# Patient Record
Sex: MALE | Race: BLACK OR AFRICAN AMERICAN | Employment: FULL TIME | URBAN - METROPOLITAN AREA
[De-identification: names, ages, dates, MRNs, and addresses within clinical notes are randomized per-mention and may not be internally consistent; named-entity substitution may affect disease eponyms.]

---

## 2017-05-04 ENCOUNTER — OFFICE VISIT (OUTPATIENT)
Dept: INTERNAL MEDICINE CLINIC | Age: 42
End: 2017-05-04

## 2017-05-04 VITALS
HEIGHT: 72 IN | RESPIRATION RATE: 19 BRPM | BODY MASS INDEX: 25.79 KG/M2 | TEMPERATURE: 98 F | HEART RATE: 66 BPM | DIASTOLIC BLOOD PRESSURE: 88 MMHG | OXYGEN SATURATION: 99 % | SYSTOLIC BLOOD PRESSURE: 120 MMHG | WEIGHT: 190.4 LBS

## 2017-05-04 DIAGNOSIS — R33.9 RETENTION, URINE: ICD-10-CM

## 2017-05-04 DIAGNOSIS — Z82.49 FAMILY HISTORY OF HEART DISEASE IN MALE FAMILY MEMBER BEFORE AGE 55: ICD-10-CM

## 2017-05-04 DIAGNOSIS — Z83.3 FAMILY HISTORY OF DIABETES MELLITUS IN MOTHER: ICD-10-CM

## 2017-05-04 DIAGNOSIS — Z76.89 ENCOUNTER TO ESTABLISH CARE: Primary | ICD-10-CM

## 2017-05-04 LAB
BILIRUB UR QL STRIP: NEGATIVE
GLUCOSE UR-MCNC: NEGATIVE MG/DL
KETONES P FAST UR STRIP-MCNC: NEGATIVE MG/DL
PH UR STRIP: 6.5 [PH] (ref 4.6–8)
PROT UR QL STRIP: NEGATIVE MG/DL
SP GR UR STRIP: 1.02 (ref 1–1.03)
UA UROBILINOGEN AMB POC: NORMAL (ref 0.2–1)
URINALYSIS CLARITY POC: CLEAR
URINALYSIS COLOR POC: NORMAL
URINE BLOOD POC: NEGATIVE
URINE LEUKOCYTES POC: NEGATIVE
URINE NITRITES POC: NEGATIVE

## 2017-05-04 RX ORDER — TAMSULOSIN HYDROCHLORIDE 0.4 MG/1
0.4 CAPSULE ORAL DAILY
Qty: 30 CAP | Refills: 1 | Status: ON HOLD | OUTPATIENT
Start: 2017-05-04 | End: 2020-01-01 | Stop reason: SDUPTHER

## 2017-05-04 NOTE — MR AVS SNAPSHOT
Visit Information Date & Time Provider Department Dept. Phone Encounter #  
 5/4/2017  9:30 AM [address] [facility] [phone] [id number] Follow-up Instructions Return in about 3 months (around 8/4/2017) for Full Physical - 30 minutes appointment. Upcoming Health Maintenance Date Due DTaP/Tdap/Td series (1 - Tdap) 7/2/1996 INFLUENZA AGE 9 TO ADULT 8/1/2017 Allergies as of 5/4/2017  Review Complete On: 5/4/2017 By: Vale Anderson MD  
 No Known Allergies Current Immunizations  Never Reviewed No immunizations on file. Not reviewed this visit You Were Diagnosed With   
  
 Codes Comments Encounter to establish care    -  Primary ICD-10-CM: Z76.89 
ICD-9-CM: V65.8 Retention, urine     ICD-10-CM: R33.9 ICD-9-CM: 788.20 Family history of diabetes mellitus in mother     ICD-10-CM: Z80.1 ICD-9-CM: V18.0 Family history of heart disease in male family member before age 54     ICD-10-CM: Z80.55 
ICD-9-CM: V17.49 Vitals BP Pulse Temp Resp Height(growth percentile) Weight(growth percentile) 120/88 (BP 1 Location: Right arm, BP Patient Position: Sitting) 66 98 °F (36.7 °C) (Oral) 19 6' 0.48\" (1.841 m) 190 lb 6.4 oz (86.4 kg) SpO2 BMI Smoking Status 99% 25.48 kg/m2 Current Every Day Smoker BMI and BSA Data Body Mass Index Body Surface Area  
 25.48 kg/m 2 2.1 m 2 Preferred Pharmacy Pharmacy Name Phone [facility] [address] [place] [address] [address] [phone] Your Updated Medication List  
  
   
This list is accurate as of: 5/4/17 10:30 AM.  Always use your most recent med list.  
  
  
  
  
 tamsulosin 0.4 mg capsule Commonly known as:  FLOMAX Take 1 Cap by mouth daily. Prescriptions Sent to Pharmacy Refills  
 tamsulosin (FLOMAX) 0.4 mg capsule 1 Sig: Take 1 Cap by mouth daily. Class: Normal  
 Pharmacy: [facility] [address], [address] [place] [address] #: [phone] Route: Oral  
  
We Performed the Following AMB POC URINALYSIS DIP STICK AUTO W/ MICRO [54340 CPT(R)] CBC WITH AUTOMATED DIFF [53497 CPT(R)] HEMOGLOBIN A1C WITH EAG [60085 CPT(R)] LIPID PANEL [39737 CPT(R)] METABOLIC PANEL, COMPREHENSIVE [18889 CPT(R)] PROSTATE SPECIFIC AG (PSA) [id number] CPT(R)] TSH 3RD GENERATION [84571 CPT(R)] Follow-up Instructions Return in about 3 months (around 8/4/2017) for Full Physical - 30 minutes appointment. Introducing [facility] & HEALTH SERVICES! Jayshree Evans introduces World[employer] patient portal. Now you can access parts of your medical record, email your doctor's office, and request medication refills online. 1. In your internet browser, go to [email or web address] 2. Click on the First Time User? Click Here link in the Sign In box. You will see the New Member Sign Up page. 3. Enter your WorldPassKey Access Code exactly as it appears below. You will not need to use this code after youve completed the sign-up process. If you do not sign up before the expiration date, you must request a new code. · WorldPassKey Access Code: [email or web address] Expires: 8/2/2017  9:46 AM 
 
4. Enter the last four digits of your Social Security Number (xxxx) and Date of Birth (mm/dd/yyyy) as indicated and click Submit. You will be taken to the next sign-up page. 5. Create a Taste Kitchent ID. This will be your WorldPassKey login ID and cannot be changed, so think of one that is secure and easy to remember. 6. Create a Taste Kitchent password. You can change your password at any time. 7. Enter your Password Reset Question and Answer. This can be used at a later time if you forget your password. 8. Enter your e-mail address.  You will receive e-mail notification when new information is available in RatherGather. 9. Click Sign Up. You can now view and download portions of your medical record. 10. Click the Download Summary menu link to download a portable copy of your medical information. If you have questions, please visit the Frequently Asked Questions section of the RatherGather website. Remember, RatherGather is NOT to be used for urgent needs. For medical emergencies, dial 911. Now available from your iPhone and Android! Please provide this summary of care documentation to your next provider. If you have any questions after today's visit, please call [phone].

## 2017-05-04 NOTE — PROGRESS NOTES
New Patient Evaluation    Chandler Mendez is a 39 y.o. male. They are here to establish care with the group and me as a primary care provider. he has seen no physician in the past 10 years. He denies past medical history. He has had issues with urination. Noting slow flow at times. Also some episodic urinary urgency. He denies polyuria. No blood in the urine. He does not drink much water. He eats a lot of junk food. Recently trying to eat better. He does not exercise regularly. He walks. Works as a [employer] technician. There are no active problems to display for this patient. No Known Allergies  No past medical history on file. No past surgical history on file. Family History   Problem Relation Age of Onset   [facility] Breast Cancer Mother     Diabetes Mother     Heart Disease Father     Hypertension Father     Stroke Father      Social History   Substance Use Topics    Smoking status: Current Every Day Smoker     Types: Cigars    Smokeless tobacco: Not on file    Alcohol use 3.0 oz/week     5 Standard drinks or equivalent per week        Health Maintenance   Topic Date Due    DTaP/Tdap/Td series (1 - Tdap) 07/02/1996    INFLUENZA AGE 9 TO ADULT  08/01/2017       Review of Systems   Constitutional: Negative. Respiratory: Negative. Cardiovascular: Negative. Gastrointestinal: Negative. Visit Vitals    /88 (BP 1 Location: Right arm, BP Patient Position: Sitting)    Pulse 66    Temp 98 °F (36.7 °C) (Oral)    Resp 19    Ht 6' 0.48\" (1.841 m)    Wt 190 lb 6.4 oz (86.4 kg)    SpO2 99%    BMI 25.48 kg/m2       Physical Exam   Constitutional: He is well-developed, well-nourished, and in no distress. Pulmonary/Chest: Effort normal and breath sounds normal.   Abdominal: Soft. Bowel sounds are normal.           ASSESSMENT/PLAN    Lin Whitt was seen today for establish care and incomplete bladder emptying.     Diagnoses and all orders for this visit:    Encounter to establish care    Retention, urine  -     AMB POC URINALYSIS DIP STICK AUTO W/ MICRO  -     PROSTATE SPECIFIC AG  -     tamsulosin (FLOMAX) 0.4 mg capsule; Take 1 Cap by mouth daily. Family history of diabetes mellitus in mother  -     TSH 3RD GENERATION  -     HEMOGLOBIN A1C WITH EAG    Family history of heart disease in male family member before age 54  -     [address] DIFF  -     METABOLIC PANEL, COMPREHENSIVE  -     LIPID PANEL        Follow-up Disposition:  Return in about 3 months (around 8/4/2017) for Full Physical - 30 minutes appointment.    -Discussed with the patient to continue the current plan of care. We will obtain baseline labwork and determine if any adjustments need to be done. We will also await the records of the previous PCP to ascertain further details of the patient's history. The patient agrees with and understands the plan of care. All questions have been answered.

## 2017-08-07 ENCOUNTER — DOCUMENTATION ONLY (OUTPATIENT)
Dept: INTERNAL MEDICINE CLINIC | Age: 42
End: 2017-08-07

## 2017-08-07 NOTE — PROGRESS NOTES
appt reminder along with lab slip mailed; reminding pt to have labs drawn prior his upcoming CPE on 8/17.

## 2017-09-18 ENCOUNTER — DOCUMENTATION ONLY (OUTPATIENT)
Dept: INTERNAL MEDICINE CLINIC | Age: 42
End: 2017-09-18

## 2017-09-27 LAB
ALBUMIN SERPL-MCNC: 4.5 G/DL (ref 3.5–5.5)
ALBUMIN/GLOB SERPL: 1.9 {RATIO} (ref 1.2–2.2)
ALP SERPL-CCNC: 61 IU/L (ref 39–117)
ALT SERPL-CCNC: 30 IU/L (ref 0–44)
AST SERPL-CCNC: 24 IU/L (ref 0–40)
BASOPHILS # BLD AUTO: 0 X10E3/UL (ref 0–0.2)
BASOPHILS NFR BLD AUTO: 0 %
BILIRUB SERPL-MCNC: 0.3 MG/DL (ref 0–1.2)
BUN SERPL-MCNC: 16 MG/DL (ref 6–24)
BUN/CREAT SERPL: 15 (ref 9–20)
CALCIUM SERPL-MCNC: 9.5 MG/DL (ref 8.7–10.2)
CHLORIDE SERPL-SCNC: 101 MMOL/L (ref 96–106)
CHOLEST SERPL-MCNC: 220 MG/DL (ref 100–199)
CO2 SERPL-SCNC: 24 MMOL/L (ref 18–29)
CREAT SERPL-MCNC: 1.09 MG/DL (ref 0.76–1.27)
EOSINOPHIL # BLD AUTO: 0.1 X10E3/UL (ref 0–0.4)
EOSINOPHIL NFR BLD AUTO: 3 %
ERYTHROCYTE [DISTWIDTH] IN BLOOD BY AUTOMATED COUNT: 14.5 % (ref 12.3–15.4)
EST. AVERAGE GLUCOSE BLD GHB EST-MCNC: 128 MG/DL
GLOBULIN SER CALC-MCNC: 2.4 G/DL (ref 1.5–4.5)
GLUCOSE SERPL-MCNC: 128 MG/DL (ref 65–99)
HBA1C MFR BLD: 6.1 % (ref 4.8–5.6)
HCT VFR BLD AUTO: 39.6 % (ref 37.5–51)
HDLC SERPL-MCNC: 37 MG/DL
HGB BLD-MCNC: 13.1 G/DL (ref 12.6–17.7)
IMM GRANULOCYTES # BLD: 0 X10E3/UL (ref 0–0.1)
IMM GRANULOCYTES NFR BLD: 0 %
LDLC SERPL CALC-MCNC: 118 MG/DL (ref 0–99)
LYMPHOCYTES # BLD AUTO: 1.5 X10E3/UL (ref 0.7–3.1)
LYMPHOCYTES NFR BLD AUTO: 33 %
MCH RBC QN AUTO: 28.4 PG (ref 26.6–33)
MCHC RBC AUTO-ENTMCNC: 33.1 G/DL (ref 31.5–35.7)
MCV RBC AUTO: 86 FL (ref 79–97)
MONOCYTES # BLD AUTO: 0.4 X10E3/UL (ref 0.1–0.9)
MONOCYTES NFR BLD AUTO: 9 %
NEUTROPHILS # BLD AUTO: 2.6 X10E3/UL (ref 1.4–7)
NEUTROPHILS NFR BLD AUTO: 55 %
PLATELET # BLD AUTO: 194 X10E3/UL (ref 150–379)
POTASSIUM SERPL-SCNC: 4 MMOL/L (ref 3.5–5.2)
PROT SERPL-MCNC: 6.9 G/DL (ref 6–8.5)
PSA SERPL-MCNC: 3 NG/ML (ref 0–4)
RBC # BLD AUTO: 4.61 X10E6/UL (ref 4.14–5.8)
SODIUM SERPL-SCNC: 143 MMOL/L (ref 134–144)
TRIGL SERPL-MCNC: 325 MG/DL (ref 0–149)
TSH SERPL DL<=0.005 MIU/L-ACNC: 1.15 UIU/ML (ref 0.45–4.5)
VLDLC SERPL CALC-MCNC: 65 MG/DL (ref 5–40)
WBC # BLD AUTO: 4.6 X10E3/UL (ref 3.4–10.8)

## 2017-09-28 ENCOUNTER — OFFICE VISIT (OUTPATIENT)
Dept: INTERNAL MEDICINE CLINIC | Age: 42
End: 2017-09-28

## 2017-09-28 VITALS
SYSTOLIC BLOOD PRESSURE: 118 MMHG | BODY MASS INDEX: 24.23 KG/M2 | OXYGEN SATURATION: 98 % | HEIGHT: 73 IN | HEART RATE: 86 BPM | RESPIRATION RATE: 19 BRPM | DIASTOLIC BLOOD PRESSURE: 80 MMHG | WEIGHT: 182.8 LBS | TEMPERATURE: 98 F

## 2017-09-28 DIAGNOSIS — Z00.00 WELL ADULT EXAM: Primary | ICD-10-CM

## 2017-09-28 DIAGNOSIS — Z23 NEED FOR TDAP VACCINATION: ICD-10-CM

## 2017-09-28 DIAGNOSIS — R33.9 URINARY RETENTION: ICD-10-CM

## 2017-09-28 NOTE — MR AVS SNAPSHOT
Visit Information Date & Time Provider Department Dept. Phone Encounter #  
 9/28/2017  8:30 AM [facility] [phone] [id number] Follow-up Instructions Return in about 1 year (around 9/28/2018) for Full Physical - 30 minutes appointment. Upcoming Health Maintenance Date Due DTaP/Tdap/Td series (1 - Tdap) 7/2/1996 Allergies as of 9/28/2017  Review Complete On: 9/28/2017 By: Mynor Jackson MD  
 No Known Allergies Current Immunizations  Never Reviewed Name Date Tdap  Incomplete Not reviewed this visit You Were Diagnosed With   
  
 Codes Comments Well adult exam    -  Primary ICD-10-CM: Z00.00 ICD-9-CM: V70.0 Need for Tdap vaccination     ICD-10-CM: I97 ICD-9-CM: V06.1 Urinary retention     ICD-10-CM: R33.9 ICD-9-CM: 788.20 Vitals BP Pulse Temp Resp Height(growth percentile) Weight(growth percentile) 118/80 (BP 1 Location: Right arm, BP Patient Position: Sitting) 86 98 °F (36.7 °C) (Oral) 19 6' 0.68\" (1.846 m) 182 lb 12.8 oz (82.9 kg) SpO2 BMI Smoking Status 98% 24.33 kg/m2 Current Every Day Smoker Vitals History BMI and BSA Data Body Mass Index Body Surface Area  
 24.33 kg/m 2 2.06 m 2 Preferred Pharmacy Pharmacy Name Phone [facility] [address] [place] [address] [phone] Your Updated Medication List  
  
   
This list is accurate as of: 9/28/17  9:16 AM.  Always use your most recent med list.  
  
  
  
  
 tamsulosin 0.4 mg capsule Commonly known as:  FLOMAX Take 1 Cap by mouth daily. We Performed the Following AR IMMUNIZ ADMIN,1 SINGLE/COMB VAC/TOXOID [id number] CPT(R)] TETANUS, DIPHTHERIA TOXOIDS AND ACELLULAR PERTUSSIS VACCINE (TDAP), IN INDIVIDS. >=7, IM [id number] CPT(R)] Follow-up Instructions Return in about 1 year (around 9/28/2018) for Full Physical - 30 minutes appointment. Introducing [facility] & HEALTH SERVICES! [facility] introduces [employer] patient portal. Now you can access parts of your medical record, email your doctor's office, and request medication refills online. 1. In your internet browser, go to [email or web address] 2. Click on the First Time User? Click Here link in the Sign In box. You will see the New Member Sign Up page. 3. Enter your Kleert Access Code exactly as it appears below. You will not need to use this code after youve completed the sign-up process. If you do not sign up before the expiration date, you must request a new code. · Pigmata Media Access Code: [facility] Expires: 12/27/2017  8:33 AM 
 
4. Enter the last four digits of your Social Security Number (xxxx) and Date of Birth (mm/dd/yyyy) as indicated and click Submit. You will be taken to the next sign-up page. 5. Create a Pigmata Media ID. This will be your Pigmata Media login ID and cannot be changed, so think of one that is secure and easy to remember. 6. Create a Pigmata Media password. You can change your password at any time. 7. Enter your Password Reset Question and Answer. This can be used at a later time if you forget your password. 8. Enter your e-mail address. You will receive e-mail notification when new information is available in [address] 9. Click Sign Up. You can now view and download portions of your medical record. 10. Click the Download Summary menu link to download a portable copy of your medical information. If you have questions, please visit the Frequently Asked Questions section of the Pigmata Media website. Remember, P[employer] Media is NOT to be used for urgent needs. For medical emergencies, dial 911. Now available from your iPhone and Android! Please provide this summary of care documentation to your next provider. If you have any questions after today's visit, please call [phone].

## 2017-09-28 NOTE — PROGRESS NOTES
Comprehensive Physical Examination    Rani Fuentes is a 43 y.o. male. he presents for a comprehensive physical examination. Urinary retention is better on Flomax. No other complaints. There are no active problems to display for this patient. Current Outpatient Prescriptions   Medication Sig Dispense Refill    tamsulosin (FLOMAX) 0.4 mg capsule Take 1 Cap by mouth daily. 30 Cap 1     No Known Allergies  No past medical history on file. History reviewed. No pertinent surgical history. Family History   Problem Relation Age of Onset    Breast Cancer Mother     Diabetes Mother     Heart Disease Father     Hypertension Father     Stroke Father      Social History   Substance Use Topics    Smoking status: Current Every Day Smoker     Types: Cigars    Smokeless tobacco: Never Used    Alcohol use 3.0 oz/week     5 Standard drinks or equivalent per week        Health Maintenance   Topic Date Due    Pneumococcal 19-64 Medium Risk (1 of 1 - PPSV23) 07/02/1994    DTaP/Tdap/Td series (1 - Tdap) 07/02/1996    INFLUENZA AGE 9 TO ADULT  08/01/2017         ROS      Visit Vitals    /80 (BP 1 Location: Right arm, BP Patient Position: Sitting)    Pulse 86    Temp 98 °F (36.7 °C) (Oral)    Resp 19    Ht 6' 0.68\" (1.846 m)    Wt 182 lb 12.8 oz (82.9 kg)    SpO2 98%    BMI 24.33 kg/m2       Physical Exam   Constitutional: He is well-developed, well-nourished, and in no distress. Cardiovascular: Normal rate and regular rhythm. Pulmonary/Chest: Effort normal and breath sounds normal. No respiratory distress. Abdominal: Soft. Bowel sounds are normal.   Musculoskeletal: Normal range of motion. Neurological: He is alert. ASSESSMENT/PLAN  Diagnoses and all orders for this visit:    1. Well adult exam    2. Need for Tdap vaccination  -     TETANUS, DIPHTHERIA TOXOIDS AND ACELLULAR PERTUSSIS VACCINE (TDAP), IN INDIVIDS. >=7, IM  -     [place] IMMUNIZ ADMIN,1 SINGLE/COMB VAC/TOXOID    3. Urinary retention      Follow-up Disposition:  Return in about 1 year (around 9/28/2018) for Full Physical - 30 minutes appointment.

## 2018-12-13 ENCOUNTER — OFFICE VISIT (OUTPATIENT)
Dept: INTERNAL MEDICINE CLINIC | Age: 43
End: 2018-12-13

## 2018-12-13 VITALS
RESPIRATION RATE: 18 BRPM | DIASTOLIC BLOOD PRESSURE: 70 MMHG | HEIGHT: 73 IN | SYSTOLIC BLOOD PRESSURE: 120 MMHG | HEART RATE: 109 BPM | BODY MASS INDEX: 24.68 KG/M2 | TEMPERATURE: 99.6 F | OXYGEN SATURATION: 96 % | WEIGHT: 186.2 LBS

## 2018-12-13 RX ORDER — ONDANSETRON 8 MG/1
8 TABLET, ORALLY DISINTEGRATING ORAL
Qty: 30 TAB | Refills: 0 | Status: SHIPPED | OUTPATIENT
Start: 2018-12-13

## 2018-12-13 NOTE — PROGRESS NOTES
----- Message from Bipin Ponce DO sent at 5/24/2017  3:47 PM CDT -----  continue Coumadin 6 mg MWF and 4 mg the rest of the week. Recheck 1 week. If subtherapeutic 1 week, then increase to 6mg MWF and 4.5mg rest of week with recheck 1 week after.   Pt stated eating two day left over BBQ that his wife brought home from work last night experiencing diarrhea and vomiting. Pt stated taking some of his daughter's nausea medicine that was given to her last year.

## 2018-12-13 NOTE — PATIENT INSTRUCTIONS
Food Poisoning: Care Instructions  Your Care Instructions    Food poisoning occurs when you eat foods that contain harmful germs. Food can be contaminated while it is growing, during processing, or when it is prepared. Fresh fruits and vegetables also can be contaminated if they are washed in contaminated water. You may have become ill after eating undercooked meat or eggs or other unsafe foods. Cooking foods thoroughly and storing them properly can help prevent food poisoning. There are many types of food poisoning. Your symptoms depend on the type of food poisoning you have. You will probably begin to feel better in 1 or 2 days. In the meantime, get plenty of rest and make sure that you do not become dehydrated. Follow-up care is a key part of your treatment and safety. Be sure to make and go to all appointments, and call your doctor if you are having problems. It's also a good idea to know your test results and keep a list of the medicines you take. How can you care for yourself at home? · To prevent dehydration, drink plenty of fluids. Choose water and other caffeine-free clear liquids until you feel better. If you have kidney, heart, or liver disease and have to limit fluids, talk with your doctor before you increase the amount of fluids you drink. · Begin eating small amounts of mild, low-fat foods, depending on how you feel. Try foods like rice, dry crackers, bananas, and applesauce. ? Avoid spicy foods, alcohol, and coffee until 48 hours after all symptoms have disappeared. ? Avoid chewing gum that contains sorbitol. ? Avoid dairy products for 3 days after symptoms disappear. · Take your medicines as prescribed. Call your doctor if you think you are having a problem with your medicine. You will get more details on the specific medicines your doctor prescribes. To prevent food poisoning  · Keep hot foods hot and cold foods cold.   · Do not eat meats, dressings, salads, or other foods that have been kept at room temperature for more than 2 hours. · Use a thermometer to check your refrigerator. It should be between 34°F and 40°F.  · Defrost meats in the refrigerator or microwave, not on the kitchen counter. · Keep your hands and your kitchen clean. Wash your hands, cutting boards, and countertops with hot, soapy water. If you use the same cutting board for chopping vegetables and preparing raw meat, be sure to wash the cutting board with hot, soapy water between each use. · Cook meat until it is well done. · Do not eat raw eggs or uncooked dough or sauces made with raw eggs. · Do not take chances. If you think food looks or tastes spoiled, throw it out. When should you call for help? Call 911 anytime you think you may need emergency care. For example, call if:    · You passed out (lost consciousness).    Call your doctor now or seek immediate medical care if:    · You have new or worse belly pain.     · You have a new or higher fever.     · You are dizzy or lightheaded, or you feel like you may faint.     · You have symptoms of dehydration, such as:  ? Dry eyes and a dry mouth. ? Passing only a little urine. ? Feeling thirstier than normal.     · You cannot keep down medicine or fluids.     · You have new or more blood in stools.     · You have new or worse vomiting or diarrhea.    Watch closely for changes in your health, and be sure to contact your doctor if:    · You do not get better as expected. Where can you learn more? Go to http://tabby-ariadne.info/. Enter J762 in the search box to learn more about \"Food Poisoning: Care Instructions. \"  Current as of: November 18, 2017  Content Version: 11.8  © 5843-5897 Glass. Care instructions adapted under license by [employer] (which disclaims liability or warranty for this information).  If you have questions about a medical condition or this instruction, always ask your healthcare professional. [employer] disclaims any warranty or liability for your use of this information.

## 2019-01-30 NOTE — PROGRESS NOTES
Acute Care Note    Maxim Gaona is 37 y.o. male. he presents for evaluation of GI Problem    The patient presents with nausea and some vomiting with diarrhea after having ingested some chop barbecue which was old. He tells me that his wife had an office party and had brought some of the meat home. Apparently, it had been left out during the day of the party and had not been refrigerated. He is feeling somewhat better upon the visit today but still does complain of nausea. Prior to Admission medications    Medication Sig Start Date End Date Taking? Authorizing Provider   ondansetron (ZOFRAN ODT) 8 mg disintegrating tablet Take 1 Tab by mouth every eight (8) hours as needed for Nausea. 12/13/18  Yes Danny Kearney MD   [facility]) 0.4 mg capsule Take 1 Cap by mouth daily. 5/4/17   Danny Kearney MD         Patient Active Problem List   Diagnosis Code    Urinary retention R33.9         Review of Systems   Constitutional: Negative. Negative for fever. Respiratory: Negative. Cardiovascular: Negative. Gastrointestinal: Positive for diarrhea, nausea and vomiting. Visit Vitals  /70 (BP 1 Location: Right arm, BP Patient Position: Sitting)   Pulse (!) 109   Temp 99.6 °F (37.6 °C) (Oral)   Resp 18   Ht 6' 0.86\" (1.851 m)   Wt 186 lb 3.2 oz (84.5 kg)   SpO2 96%   BMI 24.66 kg/m²       Physical Exam   Constitutional: No distress. Cardiovascular: Normal rate and regular rhythm. Pulmonary/Chest: Effort normal and breath sounds normal.   Abdominal: Soft. Bowel sounds are normal.           ASSESSMENT/PLAN  Diagnoses and all orders for this visit:    1. Food poisoning, accidental or unintentional, initial encounter  -     ondansetron (ZOFRAN ODT) 8 mg disintegrating tablet; Take 1 Tab by mouth every eight (8) hours as needed for Nausea.          Advised the patient to call back or return to office if symptoms worsen/change/persist.   Discussed expected course/resolution/complications of diagnosis in detail with patient. Medication risks/benefits/costs/interactions/alternatives discussed with patient. The patient was given an after visit summary which includes diagnoses, current medications, & vitals. They expressed understanding with the diagnosis and plan.

## 2020-01-01 ENCOUNTER — DOCUMENTATION ONLY (OUTPATIENT)
Dept: ONCOLOGY | Age: 45
End: 2020-01-01

## 2020-01-01 ENCOUNTER — OFFICE VISIT (OUTPATIENT)
Dept: ONCOLOGY | Age: 45
End: 2020-01-01

## 2020-01-01 ENCOUNTER — TELEPHONE (OUTPATIENT)
Dept: SURGERY | Age: 45
End: 2020-01-01

## 2020-01-01 ENCOUNTER — HOSPITAL ENCOUNTER (OUTPATIENT)
Dept: LAB | Age: 45
Discharge: HOME | End: 2020-01-27

## 2020-01-01 ENCOUNTER — APPOINTMENT (OUTPATIENT)
Dept: GENERAL RADIOLOGY | Age: 45
DRG: 698 | End: 2020-01-01
Attending: EMERGENCY MEDICINE
Payer: COMMERCIAL

## 2020-01-01 ENCOUNTER — HOSPITAL ENCOUNTER (OUTPATIENT)
Dept: PET IMAGING | Age: 45
Discharge: HOME | End: 2020-02-13
Attending: SURGERY
Payer: COMMERCIAL

## 2020-01-01 ENCOUNTER — HOSPITAL ENCOUNTER (INPATIENT)
Age: 45
LOS: 6 days | Discharge: HOME HEALTH | DRG: 698 | End: 2020-12-28
Attending: EMERGENCY MEDICINE | Admitting: FAMILY MEDICINE
Payer: COMMERCIAL

## 2020-01-01 ENCOUNTER — HOSPITAL ENCOUNTER (OUTPATIENT)
Dept: INTERVENTIONAL RADIOLOGY/VASCULAR | Age: 45
Discharge: HOME | DRG: 698 | End: 2020-12-23
Attending: HOSPITALIST
Payer: COMMERCIAL

## 2020-01-01 ENCOUNTER — APPOINTMENT (OUTPATIENT)
Dept: CT IMAGING | Age: 45
DRG: 698 | End: 2020-01-01
Attending: EMERGENCY MEDICINE
Payer: COMMERCIAL

## 2020-01-01 ENCOUNTER — OFFICE VISIT (OUTPATIENT)
Dept: SURGERY | Age: 45
End: 2020-01-01

## 2020-01-01 ENCOUNTER — HOSPITAL ENCOUNTER (OUTPATIENT)
Dept: ULTRASOUND IMAGING | Age: 45
Discharge: HOME | End: 2020-01-21
Attending: FAMILY MEDICINE
Payer: COMMERCIAL

## 2020-01-01 ENCOUNTER — APPOINTMENT (OUTPATIENT)
Dept: CT IMAGING | Age: 45
DRG: 698 | End: 2020-01-01
Attending: INTERNAL MEDICINE
Payer: COMMERCIAL

## 2020-01-01 VITALS
WEIGHT: 146.83 LBS | SYSTOLIC BLOOD PRESSURE: 117 MMHG | TEMPERATURE: 97.7 F | HEIGHT: 72 IN | BODY MASS INDEX: 19.89 KG/M2 | RESPIRATION RATE: 18 BRPM | HEART RATE: 123 BPM | DIASTOLIC BLOOD PRESSURE: 83 MMHG | OXYGEN SATURATION: 96 %

## 2020-01-01 VITALS — BODY MASS INDEX: 25.47 KG/M2 | WEIGHT: 188 LBS | HEIGHT: 72 IN

## 2020-01-01 VITALS
DIASTOLIC BLOOD PRESSURE: 87 MMHG | SYSTOLIC BLOOD PRESSURE: 132 MMHG | HEART RATE: 71 BPM | TEMPERATURE: 98.5 F | BODY MASS INDEX: 25.6 KG/M2 | OXYGEN SATURATION: 96 % | HEIGHT: 72 IN | WEIGHT: 189 LBS

## 2020-01-01 VITALS
WEIGHT: 190.8 LBS | TEMPERATURE: 98.6 F | OXYGEN SATURATION: 98 % | HEIGHT: 73 IN | SYSTOLIC BLOOD PRESSURE: 148 MMHG | RESPIRATION RATE: 20 BRPM | BODY MASS INDEX: 25.29 KG/M2 | HEART RATE: 64 BPM | DIASTOLIC BLOOD PRESSURE: 79 MMHG

## 2020-01-01 VITALS
RESPIRATION RATE: 23 BRPM | OXYGEN SATURATION: 96 % | SYSTOLIC BLOOD PRESSURE: 117 MMHG | HEART RATE: 103 BPM | DIASTOLIC BLOOD PRESSURE: 89 MMHG

## 2020-01-01 DIAGNOSIS — C76.0 HEAD AND NECK MALIGNANCY (HCC): Primary | ICD-10-CM

## 2020-01-01 DIAGNOSIS — C76.0 SQUAMOUS CELL CARCINOMA OF HEAD AND NECK (HCC): Primary | ICD-10-CM

## 2020-01-01 DIAGNOSIS — R22.1 NECK SWELLING: ICD-10-CM

## 2020-01-01 DIAGNOSIS — C78.7 HEPATIC METASTASES (HCC): ICD-10-CM

## 2020-01-01 DIAGNOSIS — R33.9 RETENTION, URINE: ICD-10-CM

## 2020-01-01 DIAGNOSIS — R18.0 MALIGNANT ASCITES: ICD-10-CM

## 2020-01-01 DIAGNOSIS — A41.9 SEPSIS, DUE TO UNSPECIFIED ORGANISM, UNSPECIFIED WHETHER ACUTE ORGAN DYSFUNCTION PRESENT (HCC): ICD-10-CM

## 2020-01-01 DIAGNOSIS — J90 PLEURAL EFFUSION: ICD-10-CM

## 2020-01-01 DIAGNOSIS — C79.51 BONY METASTASIS (HCC): ICD-10-CM

## 2020-01-01 DIAGNOSIS — C76.0 HEAD AND NECK CANCER (HCC): ICD-10-CM

## 2020-01-01 DIAGNOSIS — C76.0 HEAD AND NECK MALIGNANCY (HCC): ICD-10-CM

## 2020-01-01 DIAGNOSIS — R59.0 LYMPHADENOPATHY OF HEAD AND NECK REGION: Primary | ICD-10-CM

## 2020-01-01 DIAGNOSIS — T83.098A MALFUNCTION OF NEPHROSTOMY TUBE (HCC): ICD-10-CM

## 2020-01-01 DIAGNOSIS — C80.1 CANCER (HCC): ICD-10-CM

## 2020-01-01 LAB
ABO + RH BLD: NORMAL
ALBUMIN SERPL-MCNC: 1.7 G/DL (ref 3.5–5)
ALBUMIN SERPL-MCNC: 1.8 G/DL (ref 3.5–5)
ALBUMIN SERPL-MCNC: 1.8 G/DL (ref 3.5–5)
ALBUMIN SERPL-MCNC: 1.9 G/DL (ref 3.5–5)
ALBUMIN SERPL-MCNC: 2.1 G/DL (ref 3.5–5)
ALBUMIN SERPL-MCNC: 2.4 G/DL (ref 3.5–5)
ALBUMIN/GLOB SERPL: 0.5 {RATIO} (ref 1.1–2.2)
ALBUMIN/GLOB SERPL: 0.6 {RATIO} (ref 1.1–2.2)
ALBUMIN/GLOB SERPL: 0.6 {RATIO} (ref 1.1–2.2)
ALP SERPL-CCNC: 316 U/L (ref 45–117)
ALP SERPL-CCNC: 318 U/L (ref 45–117)
ALP SERPL-CCNC: 320 U/L (ref 45–117)
ALP SERPL-CCNC: 321 U/L (ref 45–117)
ALP SERPL-CCNC: 352 U/L (ref 45–117)
ALP SERPL-CCNC: 405 U/L (ref 45–117)
ALT SERPL-CCNC: 28 U/L (ref 12–78)
ALT SERPL-CCNC: 30 U/L (ref 12–78)
ALT SERPL-CCNC: 34 U/L (ref 12–78)
ALT SERPL-CCNC: 37 U/L (ref 12–78)
ALT SERPL-CCNC: 42 U/L (ref 12–78)
ALT SERPL-CCNC: 52 U/L (ref 12–78)
ANION GAP SERPL CALC-SCNC: 11 MMOL/L (ref 5–15)
ANION GAP SERPL CALC-SCNC: 15 MMOL/L (ref 5–15)
ANION GAP SERPL CALC-SCNC: 8 MMOL/L (ref 5–15)
ANION GAP SERPL CALC-SCNC: 9 MMOL/L (ref 5–15)
APPEARANCE UR: ABNORMAL
APTT PPP: 35.6 SEC (ref 22.1–31)
APTT PPP: 37.6 SEC (ref 22.1–31)
AST SERPL-CCNC: 105 U/L (ref 15–37)
AST SERPL-CCNC: 118 U/L (ref 15–37)
AST SERPL-CCNC: 61 U/L (ref 15–37)
AST SERPL-CCNC: 62 U/L (ref 15–37)
AST SERPL-CCNC: 62 U/L (ref 15–37)
AST SERPL-CCNC: 77 U/L (ref 15–37)
ATRIAL RATE: 107 BPM
BACTERIA SPEC CULT: NORMAL
BACTERIA SPEC CULT: NORMAL
BACTERIA URNS QL MICRO: ABNORMAL /HPF
BASOPHILS # BLD: 0 K/UL (ref 0–0.1)
BASOPHILS # BLD: 0.3 K/UL (ref 0–0.1)
BASOPHILS NFR BLD: 0 % (ref 0–1)
BASOPHILS NFR BLD: 1 % (ref 0–1)
BILIRUB SERPL-MCNC: 0.3 MG/DL (ref 0.2–1)
BILIRUB SERPL-MCNC: 0.4 MG/DL (ref 0.2–1)
BILIRUB SERPL-MCNC: 0.5 MG/DL (ref 0.2–1)
BILIRUB SERPL-MCNC: 0.9 MG/DL (ref 0.2–1)
BILIRUB UR QL CFM: NEGATIVE
BLD PROD TYP BPU: NORMAL
BLOOD GROUP ANTIBODIES SERPL: NORMAL
BPU ID: NORMAL
BUN SERPL-MCNC: 20 MG/DL (ref 6–20)
BUN SERPL-MCNC: 23 MG/DL (ref 6–20)
BUN SERPL-MCNC: 25 MG/DL (ref 6–20)
BUN SERPL-MCNC: 27 MG/DL (ref 6–20)
BUN SERPL-MCNC: 30 MG/DL (ref 6–20)
BUN SERPL-MCNC: 33 MG/DL (ref 6–20)
BUN/CREAT SERPL: 22 (ref 12–20)
BUN/CREAT SERPL: 23 (ref 12–20)
BUN/CREAT SERPL: 24 (ref 12–20)
BUN/CREAT SERPL: 28 (ref 12–20)
BUN/CREAT SERPL: 28 (ref 12–20)
BUN/CREAT SERPL: 31 (ref 12–20)
CALCIUM SERPL-MCNC: 6.5 MG/DL (ref 8.5–10.1)
CALCIUM SERPL-MCNC: 6.8 MG/DL (ref 8.5–10.1)
CALCIUM SERPL-MCNC: 6.8 MG/DL (ref 8.5–10.1)
CALCIUM SERPL-MCNC: 7.1 MG/DL (ref 8.5–10.1)
CALCIUM SERPL-MCNC: 7.4 MG/DL (ref 8.5–10.1)
CALCIUM SERPL-MCNC: 8.9 MG/DL (ref 8.5–10.1)
CALCULATED P AXIS, ECG09: 58 DEGREES
CALCULATED R AXIS, ECG10: 69 DEGREES
CALCULATED T AXIS, ECG11: 74 DEGREES
CHLORIDE SERPL-SCNC: 102 MMOL/L (ref 97–108)
CHLORIDE SERPL-SCNC: 109 MMOL/L (ref 97–108)
CHLORIDE SERPL-SCNC: 110 MMOL/L (ref 97–108)
CHLORIDE SERPL-SCNC: 110 MMOL/L (ref 97–108)
CHLORIDE SERPL-SCNC: 111 MMOL/L (ref 97–108)
CHLORIDE SERPL-SCNC: 111 MMOL/L (ref 97–108)
CO2 SERPL-SCNC: 21 MMOL/L (ref 21–32)
CO2 SERPL-SCNC: 22 MMOL/L (ref 21–32)
CO2 SERPL-SCNC: 23 MMOL/L (ref 21–32)
CO2 SERPL-SCNC: 25 MMOL/L (ref 21–32)
COLOR UR: ABNORMAL
COMMENT, HOLDF: NORMAL
COPPER SERPL-MCNC: 155 UG/DL (ref 72–166)
CREAT SERPL-MCNC: 0.82 MG/DL (ref 0.7–1.3)
CREAT SERPL-MCNC: 0.89 MG/DL (ref 0.7–1.3)
CREAT SERPL-MCNC: 0.96 MG/DL (ref 0.7–1.3)
CREAT SERPL-MCNC: 1.03 MG/DL (ref 0.7–1.3)
CREAT SERPL-MCNC: 1.15 MG/DL (ref 0.7–1.3)
CREAT SERPL-MCNC: 1.2 MG/DL (ref 0.7–1.3)
CROSSMATCH RESULT,%XM: NORMAL
DIAGNOSIS, 93000: NORMAL
DIFFERENTIAL METHOD BLD: ABNORMAL
EOSINOPHIL # BLD: 0 K/UL (ref 0–0.4)
EOSINOPHIL # BLD: 0.3 K/UL (ref 0–0.4)
EOSINOPHIL # BLD: 0.3 K/UL (ref 0–0.4)
EOSINOPHIL NFR BLD: 0 % (ref 0–7)
EOSINOPHIL NFR BLD: 1 % (ref 0–7)
EOSINOPHIL NFR BLD: 1 % (ref 0–7)
EPITH CASTS URNS QL MICRO: ABNORMAL /LPF
ERYTHROCYTE [DISTWIDTH] IN BLOOD BY AUTOMATED COUNT: 18.4 % (ref 11.5–14.5)
ERYTHROCYTE [DISTWIDTH] IN BLOOD BY AUTOMATED COUNT: 18.4 % (ref 11.5–14.5)
ERYTHROCYTE [DISTWIDTH] IN BLOOD BY AUTOMATED COUNT: 18.6 % (ref 11.5–14.5)
ERYTHROCYTE [DISTWIDTH] IN BLOOD BY AUTOMATED COUNT: 18.6 % (ref 11.5–14.5)
ERYTHROCYTE [DISTWIDTH] IN BLOOD BY AUTOMATED COUNT: 18.8 % (ref 11.5–14.5)
ERYTHROCYTE [DISTWIDTH] IN BLOOD BY AUTOMATED COUNT: 18.9 % (ref 11.5–14.5)
ERYTHROCYTE [DISTWIDTH] IN BLOOD BY AUTOMATED COUNT: 19 % (ref 11.5–14.5)
FERRITIN SERPL-MCNC: 8447 NG/ML (ref 26–388)
FIBRINOGEN PPP-MCNC: 175 MG/DL (ref 200–475)
FIBRINOGEN PPP-MCNC: 175 MG/DL (ref 200–475)
FOLATE SERPL-MCNC: 14.1 NG/ML (ref 5–21)
FOLATE SERPL-MCNC: 7.9 NG/ML (ref 5–21)
GLOBULIN SER CALC-MCNC: 3.3 G/DL (ref 2–4)
GLOBULIN SER CALC-MCNC: 3.5 G/DL (ref 2–4)
GLOBULIN SER CALC-MCNC: 3.5 G/DL (ref 2–4)
GLOBULIN SER CALC-MCNC: 3.6 G/DL (ref 2–4)
GLOBULIN SER CALC-MCNC: 3.7 G/DL (ref 2–4)
GLOBULIN SER CALC-MCNC: 4.2 G/DL (ref 2–4)
GLUCOSE BLD STRIP.AUTO-MCNC: 81 MG/DL (ref 65–100)
GLUCOSE SERPL-MCNC: 59 MG/DL (ref 65–100)
GLUCOSE SERPL-MCNC: 67 MG/DL (ref 65–100)
GLUCOSE SERPL-MCNC: 72 MG/DL (ref 65–100)
GLUCOSE SERPL-MCNC: 74 MG/DL (ref 65–100)
GLUCOSE SERPL-MCNC: 84 MG/DL (ref 65–100)
GLUCOSE SERPL-MCNC: 85 MG/DL (ref 65–100)
GLUCOSE UR STRIP.AUTO-MCNC: NEGATIVE MG/DL
HAPTOGLOB SERPL-MCNC: 296 MG/DL (ref 30–200)
HCT VFR BLD AUTO: 20.2 % (ref 36.6–50.3)
HCT VFR BLD AUTO: 22.5 % (ref 36.6–50.3)
HCT VFR BLD AUTO: 22.9 % (ref 36.6–50.3)
HCT VFR BLD AUTO: 23.6 % (ref 36.6–50.3)
HCT VFR BLD AUTO: 24 % (ref 36.6–50.3)
HCT VFR BLD AUTO: 24.8 % (ref 36.6–50.3)
HCT VFR BLD AUTO: 29.8 % (ref 36.6–50.3)
HGB BLD-MCNC: 6.5 G/DL (ref 12.1–17)
HGB BLD-MCNC: 7.1 G/DL (ref 12.1–17)
HGB BLD-MCNC: 7.4 G/DL (ref 12.1–17)
HGB BLD-MCNC: 7.5 G/DL (ref 12.1–17)
HGB BLD-MCNC: 7.7 G/DL (ref 12.1–17)
HGB BLD-MCNC: 8 G/DL (ref 12.1–17)
HGB BLD-MCNC: 9.5 G/DL (ref 12.1–17)
HGB UR QL STRIP: ABNORMAL
HISTORY CHECKED?,CKHIST: NORMAL
IMM GRANULOCYTES # BLD AUTO: 0 K/UL
IMM GRANULOCYTES NFR BLD AUTO: 0 %
INR PPP: 1.4 (ref 0.9–1.1)
INR PPP: 1.4 (ref 0.9–1.1)
INR PPP: 1.5 (ref 0.9–1.1)
IRON SATN MFR SERPL: 51 % (ref 20–50)
IRON SERPL-MCNC: 83 UG/DL (ref 35–150)
KETONES UR QL STRIP.AUTO: ABNORMAL MG/DL
LACTATE SERPL-SCNC: 2.2 MMOL/L (ref 0.4–2)
LACTATE SERPL-SCNC: 2.2 MMOL/L (ref 0.4–2)
LACTATE SERPL-SCNC: 2.6 MMOL/L (ref 0.4–2)
LACTATE SERPL-SCNC: 2.6 MMOL/L (ref 0.4–2)
LACTATE SERPL-SCNC: 2.7 MMOL/L (ref 0.4–2)
LACTATE SERPL-SCNC: 2.8 MMOL/L (ref 0.4–2)
LACTATE SERPL-SCNC: 3.5 MMOL/L (ref 0.4–2)
LACTATE SERPL-SCNC: 3.7 MMOL/L (ref 0.4–2)
LACTATE SERPL-SCNC: 4.3 MMOL/L (ref 0.4–2)
LACTATE SERPL-SCNC: 4.5 MMOL/L (ref 0.4–2)
LDH SERPL L TO P-CCNC: 3478 U/L (ref 85–241)
LEUKOCYTE ESTERASE UR QL STRIP.AUTO: ABNORMAL
LIPASE SERPL-CCNC: 176 U/L (ref 73–393)
LYMPHOCYTES # BLD: 0 K/UL (ref 0.8–3.5)
LYMPHOCYTES # BLD: 0.3 K/UL (ref 0.8–3.5)
LYMPHOCYTES # BLD: 0.6 K/UL (ref 0.8–3.5)
LYMPHOCYTES # BLD: 0.6 K/UL (ref 0.8–3.5)
LYMPHOCYTES # BLD: 0.8 K/UL (ref 0.8–3.5)
LYMPHOCYTES # BLD: 1.3 K/UL (ref 0.8–3.5)
LYMPHOCYTES # BLD: 1.4 K/UL (ref 0.8–3.5)
LYMPHOCYTES NFR BLD: 0 % (ref 12–49)
LYMPHOCYTES NFR BLD: 1 % (ref 12–49)
LYMPHOCYTES NFR BLD: 2 % (ref 12–49)
LYMPHOCYTES NFR BLD: 2 % (ref 12–49)
LYMPHOCYTES NFR BLD: 3 % (ref 12–49)
LYMPHOCYTES NFR BLD: 4 % (ref 12–49)
LYMPHOCYTES NFR BLD: 4 % (ref 12–49)
MAGNESIUM SERPL-MCNC: 1.3 MG/DL (ref 1.6–2.4)
MAGNESIUM SERPL-MCNC: 1.4 MG/DL (ref 1.6–2.4)
MCH RBC QN AUTO: 28.2 PG (ref 26–34)
MCH RBC QN AUTO: 28.3 PG (ref 26–34)
MCH RBC QN AUTO: 28.3 PG (ref 26–34)
MCH RBC QN AUTO: 28.5 PG (ref 26–34)
MCH RBC QN AUTO: 28.5 PG (ref 26–34)
MCH RBC QN AUTO: 28.6 PG (ref 26–34)
MCH RBC QN AUTO: 28.9 PG (ref 26–34)
MCHC RBC AUTO-ENTMCNC: 31.6 G/DL (ref 30–36.5)
MCHC RBC AUTO-ENTMCNC: 31.8 G/DL (ref 30–36.5)
MCHC RBC AUTO-ENTMCNC: 31.9 G/DL (ref 30–36.5)
MCHC RBC AUTO-ENTMCNC: 32.1 G/DL (ref 30–36.5)
MCHC RBC AUTO-ENTMCNC: 32.2 G/DL (ref 30–36.5)
MCHC RBC AUTO-ENTMCNC: 32.3 G/DL (ref 30–36.5)
MCHC RBC AUTO-ENTMCNC: 32.3 G/DL (ref 30–36.5)
MCV RBC AUTO: 88.1 FL (ref 80–99)
MCV RBC AUTO: 88.2 FL (ref 80–99)
MCV RBC AUTO: 88.7 FL (ref 80–99)
MCV RBC AUTO: 89 FL (ref 80–99)
MCV RBC AUTO: 89.3 FL (ref 80–99)
MCV RBC AUTO: 89.5 FL (ref 80–99)
MCV RBC AUTO: 89.7 FL (ref 80–99)
METAMYELOCYTES NFR BLD MANUAL: 1 %
METAMYELOCYTES NFR BLD MANUAL: 2 %
MONOCYTES # BLD: 0.3 K/UL (ref 0–1)
MONOCYTES # BLD: 0.3 K/UL (ref 0–1)
MONOCYTES # BLD: 0.6 K/UL (ref 0–1)
MONOCYTES # BLD: 0.7 K/UL (ref 0–1)
MONOCYTES # BLD: 0.8 K/UL (ref 0–1)
MONOCYTES # BLD: 0.8 K/UL (ref 0–1)
MONOCYTES # BLD: 1.3 K/UL (ref 0–1)
MONOCYTES NFR BLD: 1 % (ref 5–13)
MONOCYTES NFR BLD: 1 % (ref 5–13)
MONOCYTES NFR BLD: 2 % (ref 5–13)
MONOCYTES NFR BLD: 2 % (ref 5–13)
MONOCYTES NFR BLD: 3 % (ref 5–13)
MONOCYTES NFR BLD: 3 % (ref 5–13)
MONOCYTES NFR BLD: 4 % (ref 5–13)
MYELOCYTES NFR BLD MANUAL: 1 %
MYELOCYTES NFR BLD MANUAL: 2 %
MYELOCYTES NFR BLD MANUAL: 3 %
NEUTS BAND NFR BLD MANUAL: 10 % (ref 0–6)
NEUTS BAND NFR BLD MANUAL: 12 % (ref 0–6)
NEUTS BAND NFR BLD MANUAL: 14 % (ref 0–6)
NEUTS BAND NFR BLD MANUAL: 2 % (ref 0–6)
NEUTS BAND NFR BLD MANUAL: 3 % (ref 0–6)
NEUTS BAND NFR BLD MANUAL: 4 % (ref 0–6)
NEUTS BAND NFR BLD MANUAL: 8 % (ref 0–6)
NEUTS SEG # BLD: 23.2 K/UL (ref 1.8–8)
NEUTS SEG # BLD: 25.3 K/UL (ref 1.8–8)
NEUTS SEG # BLD: 29 K/UL (ref 1.8–8)
NEUTS SEG # BLD: 29.1 K/UL (ref 1.8–8)
NEUTS SEG # BLD: 29.2 K/UL (ref 1.8–8)
NEUTS SEG # BLD: 32.2 K/UL (ref 1.8–8)
NEUTS SEG # BLD: 33.7 K/UL (ref 1.8–8)
NEUTS SEG NFR BLD: 75 % (ref 32–75)
NEUTS SEG NFR BLD: 77 % (ref 32–75)
NEUTS SEG NFR BLD: 80 % (ref 32–75)
NEUTS SEG NFR BLD: 88 % (ref 32–75)
NEUTS SEG NFR BLD: 88 % (ref 32–75)
NEUTS SEG NFR BLD: 93 % (ref 32–75)
NEUTS SEG NFR BLD: 95 % (ref 32–75)
NITRITE UR QL STRIP.AUTO: POSITIVE
NRBC # BLD: 0.18 K/UL (ref 0–0.01)
NRBC # BLD: 0.19 K/UL (ref 0–0.01)
NRBC # BLD: 0.23 K/UL (ref 0–0.01)
NRBC # BLD: 0.23 K/UL (ref 0–0.01)
NRBC # BLD: 0.24 K/UL (ref 0–0.01)
NRBC # BLD: 0.25 K/UL (ref 0–0.01)
NRBC # BLD: 0.35 K/UL (ref 0–0.01)
NRBC BLD-RTO: 0.5 PER 100 WBC
NRBC BLD-RTO: 0.6 PER 100 WBC
NRBC BLD-RTO: 0.7 PER 100 WBC
NRBC BLD-RTO: 0.8 PER 100 WBC
NRBC BLD-RTO: 0.9 PER 100 WBC
NRBC BLD-RTO: 0.9 PER 100 WBC
NRBC BLD-RTO: 1 PER 100 WBC
P-R INTERVAL, ECG05: 92 MS
PH UR STRIP: 6.5 [PH] (ref 5–8)
PHOSPHATE SERPL-MCNC: 4.8 MG/DL (ref 2.6–4.7)
PLATELET # BLD AUTO: 108 K/UL (ref 150–400)
PLATELET # BLD AUTO: 128 K/UL (ref 150–400)
PLATELET # BLD AUTO: 71 K/UL (ref 150–400)
PLATELET # BLD AUTO: 73 K/UL (ref 150–400)
PLATELET # BLD AUTO: 77 K/UL (ref 150–400)
PLATELET # BLD AUTO: 83 K/UL (ref 150–400)
PLATELET # BLD AUTO: 98 K/UL (ref 150–400)
PMV BLD AUTO: 10.1 FL (ref 8.9–12.9)
PMV BLD AUTO: 10.1 FL (ref 8.9–12.9)
PMV BLD AUTO: 10.2 FL (ref 8.9–12.9)
PMV BLD AUTO: 10.3 FL (ref 8.9–12.9)
PMV BLD AUTO: 10.5 FL (ref 8.9–12.9)
PMV BLD AUTO: 9.9 FL (ref 8.9–12.9)
PMV BLD AUTO: 9.9 FL (ref 8.9–12.9)
POTASSIUM SERPL-SCNC: 3.2 MMOL/L (ref 3.5–5.1)
POTASSIUM SERPL-SCNC: 3.3 MMOL/L (ref 3.5–5.1)
POTASSIUM SERPL-SCNC: 3.6 MMOL/L (ref 3.5–5.1)
POTASSIUM SERPL-SCNC: 3.7 MMOL/L (ref 3.5–5.1)
POTASSIUM SERPL-SCNC: 3.8 MMOL/L (ref 3.5–5.1)
POTASSIUM SERPL-SCNC: 3.9 MMOL/L (ref 3.5–5.1)
PROT SERPL-MCNC: 5.1 G/DL (ref 6.4–8.2)
PROT SERPL-MCNC: 5.2 G/DL (ref 6.4–8.2)
PROT SERPL-MCNC: 5.3 G/DL (ref 6.4–8.2)
PROT SERPL-MCNC: 5.5 G/DL (ref 6.4–8.2)
PROT SERPL-MCNC: 5.8 G/DL (ref 6.4–8.2)
PROT SERPL-MCNC: 6.6 G/DL (ref 6.4–8.2)
PROT UR STRIP-MCNC: 100 MG/DL
PROTHROMBIN TIME: 14.8 SEC (ref 9–11.1)
PROTHROMBIN TIME: 14.9 SEC (ref 9–11.1)
PROTHROMBIN TIME: 15.4 SEC (ref 9–11.1)
Q-T INTERVAL, ECG07: 318 MS
QRS DURATION, ECG06: 68 MS
QTC CALCULATION (BEZET), ECG08: 424 MS
RBC # BLD AUTO: 2.27 M/UL (ref 4.1–5.7)
RBC # BLD AUTO: 2.52 M/UL (ref 4.1–5.7)
RBC # BLD AUTO: 2.6 M/UL (ref 4.1–5.7)
RBC # BLD AUTO: 2.63 M/UL (ref 4.1–5.7)
RBC # BLD AUTO: 2.72 M/UL (ref 4.1–5.7)
RBC # BLD AUTO: 2.77 M/UL (ref 4.1–5.7)
RBC # BLD AUTO: 3.36 M/UL (ref 4.1–5.7)
RBC #/AREA URNS HPF: >100 /HPF (ref 0–5)
RBC MORPH BLD: ABNORMAL
RETICS # AUTO: 0.04 M/UL (ref 0.03–0.1)
RETICS/RBC NFR AUTO: 1.3 % (ref 0.7–2.1)
SAMPLES BEING HELD,HOLD: NORMAL
SERVICE CMNT-IMP: NORMAL
SODIUM SERPL-SCNC: 140 MMOL/L (ref 136–145)
SODIUM SERPL-SCNC: 141 MMOL/L (ref 136–145)
SODIUM SERPL-SCNC: 142 MMOL/L (ref 136–145)
SP GR UR REFRACTOMETRY: 1.01 (ref 1–1.03)
SPECIMEN EXP DATE BLD: NORMAL
STATUS OF UNIT,%ST: NORMAL
THERAPEUTIC RANGE,PTTT: ABNORMAL SECS (ref 58–77)
THERAPEUTIC RANGE,PTTT: ABNORMAL SECS (ref 58–77)
TIBC SERPL-MCNC: 163 UG/DL (ref 250–450)
TSH SERPL DL<=0.05 MIU/L-ACNC: 1.98 UIU/ML (ref 0.36–3.74)
UNIT DIVISION, %UDIV: 0
UROBILINOGEN UR QL STRIP.AUTO: 0.2 EU/DL (ref 0.2–1)
VENTRICULAR RATE, ECG03: 107 BPM
VIT B12 SERPL-MCNC: >2000 PG/ML (ref 193–986)
VIT B12 SERPL-MCNC: >2000 PG/ML (ref 193–986)
WBC # BLD AUTO: 25.5 K/UL (ref 4.1–11.1)
WBC # BLD AUTO: 28.1 K/UL (ref 4.1–11.1)
WBC # BLD AUTO: 30.2 K/UL (ref 4.1–11.1)
WBC # BLD AUTO: 30.6 K/UL (ref 4.1–11.1)
WBC # BLD AUTO: 33.6 K/UL (ref 4.1–11.1)
WBC # BLD AUTO: 34.4 K/UL (ref 4.1–11.1)
WBC # BLD AUTO: 35 K/UL (ref 4.1–11.1)
WBC MORPH BLD: ABNORMAL
WBC MORPH BLD: ABNORMAL
WBC URNS QL MICRO: ABNORMAL /HPF (ref 0–4)
ZINC SERPL-MCNC: 78 UG/DL (ref 56–134)

## 2020-01-01 PROCEDURE — 74011250636 HC RX REV CODE- 250/636: Performed by: INTERNAL MEDICINE

## 2020-01-01 PROCEDURE — 83735 ASSAY OF MAGNESIUM: CPT

## 2020-01-01 PROCEDURE — 36415 COLL VENOUS BLD VENIPUNCTURE: CPT

## 2020-01-01 PROCEDURE — 74011000258 HC RX REV CODE- 258: Performed by: INTERNAL MEDICINE

## 2020-01-01 PROCEDURE — 74011000250 HC RX REV CODE- 250: Performed by: STUDENT IN AN ORGANIZED HEALTH CARE EDUCATION/TRAINING PROGRAM

## 2020-01-01 PROCEDURE — 74011000258 HC RX REV CODE- 258: Performed by: EMERGENCY MEDICINE

## 2020-01-01 PROCEDURE — 83605 ASSAY OF LACTIC ACID: CPT

## 2020-01-01 PROCEDURE — 74011250636 HC RX REV CODE- 250/636: Performed by: NURSE PRACTITIONER

## 2020-01-01 PROCEDURE — 83690 ASSAY OF LIPASE: CPT

## 2020-01-01 PROCEDURE — 84443 ASSAY THYROID STIM HORMONE: CPT

## 2020-01-01 PROCEDURE — 85045 AUTOMATED RETICULOCYTE COUNT: CPT

## 2020-01-01 PROCEDURE — 70450 CT HEAD/BRAIN W/O DYE: CPT

## 2020-01-01 PROCEDURE — 65660000001 HC RM ICU INTERMED STEPDOWN

## 2020-01-01 PROCEDURE — 84630 ASSAY OF ZINC: CPT

## 2020-01-01 PROCEDURE — 74011000636 HC RX REV CODE- 636: Performed by: STUDENT IN AN ORGANIZED HEALTH CARE EDUCATION/TRAINING PROGRAM

## 2020-01-01 PROCEDURE — 99285 EMERGENCY DEPT VISIT HI MDM: CPT

## 2020-01-01 PROCEDURE — 85025 COMPLETE CBC W/AUTO DIFF WBC: CPT

## 2020-01-01 PROCEDURE — 65660000000 HC RM CCU STEPDOWN

## 2020-01-01 PROCEDURE — 82746 ASSAY OF FOLIC ACID SERUM: CPT

## 2020-01-01 PROCEDURE — 80053 COMPREHEN METABOLIC PANEL: CPT

## 2020-01-01 PROCEDURE — 82728 ASSAY OF FERRITIN: CPT

## 2020-01-01 PROCEDURE — 82962 GLUCOSE BLOOD TEST: CPT

## 2020-01-01 PROCEDURE — 83550 IRON BINDING TEST: CPT

## 2020-01-01 PROCEDURE — 74011250637 HC RX REV CODE- 250/637: Performed by: NURSE PRACTITIONER

## 2020-01-01 PROCEDURE — 74011250637 HC RX REV CODE- 250/637: Performed by: INTERNAL MEDICINE

## 2020-01-01 PROCEDURE — 0T25X0Z CHANGE DRAINAGE DEVICE IN KIDNEY, EXTERNAL APPROACH: ICD-10-PCS | Performed by: STUDENT IN AN ORGANIZED HEALTH CARE EDUCATION/TRAINING PROGRAM

## 2020-01-01 PROCEDURE — 50387 CHANGE NEPHROURETERAL CATH: CPT

## 2020-01-01 PROCEDURE — 76536 US EXAM OF HEAD AND NECK: CPT

## 2020-01-01 PROCEDURE — 93005 ELECTROCARDIOGRAM TRACING: CPT

## 2020-01-01 PROCEDURE — 85384 FIBRINOGEN ACTIVITY: CPT

## 2020-01-01 PROCEDURE — 74011250636 HC RX REV CODE- 250/636: Performed by: EMERGENCY MEDICINE

## 2020-01-01 PROCEDURE — C2617 STENT, NON-COR, TEM W/O DEL: HCPCS

## 2020-01-01 PROCEDURE — 86923 COMPATIBILITY TEST ELECTRIC: CPT

## 2020-01-01 PROCEDURE — 74011000636 HC RX REV CODE- 636: Performed by: RADIOLOGY

## 2020-01-01 PROCEDURE — 81001 URINALYSIS AUTO W/SCOPE: CPT

## 2020-01-01 PROCEDURE — 82607 VITAMIN B-12: CPT

## 2020-01-01 PROCEDURE — 96368 THER/DIAG CONCURRENT INF: CPT

## 2020-01-01 PROCEDURE — 74176 CT ABD & PELVIS W/O CONTRAST: CPT

## 2020-01-01 PROCEDURE — 36430 TRANSFUSION BLD/BLD COMPNT: CPT

## 2020-01-01 PROCEDURE — 83615 LACTATE (LD) (LDH) ENZYME: CPT

## 2020-01-01 PROCEDURE — 96366 THER/PROPH/DIAG IV INF ADDON: CPT

## 2020-01-01 PROCEDURE — 82525 ASSAY OF COPPER: CPT

## 2020-01-01 PROCEDURE — 83010 ASSAY OF HAPTOGLOBIN QUANT: CPT

## 2020-01-01 PROCEDURE — P9016 RBC LEUKOCYTES REDUCED: HCPCS

## 2020-01-01 PROCEDURE — 86901 BLOOD TYPING SEROLOGIC RH(D): CPT

## 2020-01-01 PROCEDURE — A9552 F18 FDG: HCPCS

## 2020-01-01 PROCEDURE — 96365 THER/PROPH/DIAG IV INF INIT: CPT

## 2020-01-01 PROCEDURE — 71275 CT ANGIOGRAPHY CHEST: CPT

## 2020-01-01 PROCEDURE — 87040 BLOOD CULTURE FOR BACTERIA: CPT

## 2020-01-01 PROCEDURE — 84100 ASSAY OF PHOSPHORUS: CPT

## 2020-01-01 PROCEDURE — 74011250636 HC RX REV CODE- 250/636: Performed by: STUDENT IN AN ORGANIZED HEALTH CARE EDUCATION/TRAINING PROGRAM

## 2020-01-01 PROCEDURE — 71045 X-RAY EXAM CHEST 1 VIEW: CPT

## 2020-01-01 PROCEDURE — 85730 THROMBOPLASTIN TIME PARTIAL: CPT

## 2020-01-01 PROCEDURE — 97116 GAIT TRAINING THERAPY: CPT

## 2020-01-01 PROCEDURE — 87086 URINE CULTURE/COLONY COUNT: CPT

## 2020-01-01 PROCEDURE — 97161 PT EVAL LOW COMPLEX 20 MIN: CPT

## 2020-01-01 PROCEDURE — 85610 PROTHROMBIN TIME: CPT

## 2020-01-01 RX ORDER — SODIUM CHLORIDE 9 MG/ML
500 INJECTION, SOLUTION INTRAVENOUS CONTINUOUS
Status: DISCONTINUED | OUTPATIENT
Start: 2020-01-01 | End: 2020-01-01

## 2020-01-01 RX ORDER — SODIUM CHLORIDE 0.9 % (FLUSH) 0.9 %
10 SYRINGE (ML) INJECTION
Status: COMPLETED | OUTPATIENT
Start: 2020-01-01 | End: 2020-01-01

## 2020-01-01 RX ORDER — ACETAMINOPHEN 650 MG/1
650 SUPPOSITORY RECTAL
Status: DISCONTINUED | OUTPATIENT
Start: 2020-01-01 | End: 2020-01-01 | Stop reason: HOSPADM

## 2020-01-01 RX ORDER — HEPARIN SODIUM 5000 [USP'U]/ML
5000 INJECTION, SOLUTION INTRAVENOUS; SUBCUTANEOUS EVERY 8 HOURS
Status: DISCONTINUED | OUTPATIENT
Start: 2020-01-01 | End: 2020-01-01

## 2020-01-01 RX ORDER — OXYCODONE HYDROCHLORIDE 5 MG/1
5 TABLET ORAL
Status: DISCONTINUED | OUTPATIENT
Start: 2020-01-01 | End: 2020-01-01

## 2020-01-01 RX ORDER — SODIUM CHLORIDE 9 MG/ML
250 INJECTION, SOLUTION INTRAVENOUS AS NEEDED
Status: DISCONTINUED | OUTPATIENT
Start: 2020-01-01 | End: 2020-01-01 | Stop reason: HOSPADM

## 2020-01-01 RX ORDER — PROMETHAZINE HYDROCHLORIDE 6.25 MG/5ML
10 SYRUP ORAL
COMMUNITY

## 2020-01-01 RX ORDER — ACETAMINOPHEN 325 MG/1
650 TABLET ORAL
Status: DISCONTINUED | OUTPATIENT
Start: 2020-01-01 | End: 2020-01-01 | Stop reason: HOSPADM

## 2020-01-01 RX ORDER — BISMUTH SUBSALICYLATE 262 MG
1 TABLET,CHEWABLE ORAL DAILY
COMMUNITY

## 2020-01-01 RX ORDER — SODIUM CHLORIDE 0.9 % (FLUSH) 0.9 %
5-10 SYRINGE (ML) INJECTION AS NEEDED
Status: DISCONTINUED | OUTPATIENT
Start: 2020-01-01 | End: 2020-01-01 | Stop reason: HOSPADM

## 2020-01-01 RX ORDER — AMOXICILLIN AND CLAVULANATE POTASSIUM 875; 125 MG/1; MG/1
1 TABLET, FILM COATED ORAL 2 TIMES DAILY
Qty: 8 TAB | Refills: 0 | Status: SHIPPED | OUTPATIENT
Start: 2020-01-01 | End: 2021-01-01

## 2020-01-01 RX ORDER — POLYETHYLENE GLYCOL 3350 17 G/17G
17 POWDER, FOR SOLUTION ORAL DAILY PRN
Status: DISCONTINUED | OUTPATIENT
Start: 2020-01-01 | End: 2020-01-01 | Stop reason: HOSPADM

## 2020-01-01 RX ORDER — SODIUM CHLORIDE 0.9 % (FLUSH) 0.9 %
5-40 SYRINGE (ML) INJECTION AS NEEDED
Status: DISCONTINUED | OUTPATIENT
Start: 2020-01-01 | End: 2020-01-01 | Stop reason: HOSPADM

## 2020-01-01 RX ORDER — OXYCODONE HYDROCHLORIDE 5 MG/1
10 TABLET ORAL
Status: DISCONTINUED | OUTPATIENT
Start: 2020-01-01 | End: 2020-01-01 | Stop reason: HOSPADM

## 2020-01-01 RX ORDER — ONDANSETRON 2 MG/ML
4 INJECTION INTRAMUSCULAR; INTRAVENOUS
Status: DISCONTINUED | OUTPATIENT
Start: 2020-01-01 | End: 2020-01-01 | Stop reason: HOSPADM

## 2020-01-01 RX ORDER — FENTANYL CITRATE 50 UG/ML
200 INJECTION, SOLUTION INTRAMUSCULAR; INTRAVENOUS
Status: DISCONTINUED | OUTPATIENT
Start: 2020-01-01 | End: 2020-01-01

## 2020-01-01 RX ORDER — MIDAZOLAM HYDROCHLORIDE 1 MG/ML
5 INJECTION, SOLUTION INTRAMUSCULAR; INTRAVENOUS
Status: DISCONTINUED | OUTPATIENT
Start: 2020-01-01 | End: 2020-01-01

## 2020-01-01 RX ORDER — LEVOFLOXACIN 5 MG/ML
750 INJECTION, SOLUTION INTRAVENOUS
Status: COMPLETED | OUTPATIENT
Start: 2020-01-01 | End: 2020-01-01

## 2020-01-01 RX ORDER — HEPARIN 100 UNIT/ML
300 SYRINGE INTRAVENOUS AS NEEDED
Status: DISCONTINUED | OUTPATIENT
Start: 2020-01-01 | End: 2020-01-01 | Stop reason: HOSPADM

## 2020-01-01 RX ORDER — OXYCODONE HYDROCHLORIDE 10 MG/1
10 TABLET ORAL
Qty: 12 TAB | Refills: 0 | Status: SHIPPED | OUTPATIENT
Start: 2020-01-01 | End: 2020-01-01

## 2020-01-01 RX ORDER — PROMETHAZINE HYDROCHLORIDE 25 MG/1
12.5 TABLET ORAL
Status: DISCONTINUED | OUTPATIENT
Start: 2020-01-01 | End: 2020-01-01 | Stop reason: HOSPADM

## 2020-01-01 RX ORDER — SODIUM CHLORIDE, SODIUM LACTATE, POTASSIUM CHLORIDE, CALCIUM CHLORIDE 600; 310; 30; 20 MG/100ML; MG/100ML; MG/100ML; MG/100ML
50 INJECTION, SOLUTION INTRAVENOUS CONTINUOUS
Status: DISCONTINUED | OUTPATIENT
Start: 2020-01-01 | End: 2020-01-01

## 2020-01-01 RX ORDER — MORPHINE SULFATE 2 MG/ML
2 INJECTION, SOLUTION INTRAMUSCULAR; INTRAVENOUS ONCE
Status: COMPLETED | OUTPATIENT
Start: 2020-01-01 | End: 2020-01-01

## 2020-01-01 RX ORDER — LIDOCAINE HYDROCHLORIDE 20 MG/ML
20 INJECTION, SOLUTION INFILTRATION; PERINEURAL
Status: COMPLETED | OUTPATIENT
Start: 2020-01-01 | End: 2020-01-01

## 2020-01-01 RX ORDER — POTASSIUM CHLORIDE 750 MG/1
40 TABLET, FILM COATED, EXTENDED RELEASE ORAL
Status: COMPLETED | OUTPATIENT
Start: 2020-01-01 | End: 2020-01-01

## 2020-01-01 RX ORDER — TAMSULOSIN HYDROCHLORIDE 0.4 MG/1
0.4 CAPSULE ORAL DAILY
Qty: 30 CAP | Refills: 1 | Status: SHIPPED | OUTPATIENT
Start: 2020-01-01

## 2020-01-01 RX ORDER — TAMSULOSIN HYDROCHLORIDE 0.4 MG/1
0.4 CAPSULE ORAL DAILY
Status: DISCONTINUED | OUTPATIENT
Start: 2020-01-01 | End: 2020-01-01 | Stop reason: HOSPADM

## 2020-01-01 RX ORDER — LANOLIN ALCOHOL/MO/W.PET/CERES
3 CREAM (GRAM) TOPICAL
Status: DISCONTINUED | OUTPATIENT
Start: 2020-01-01 | End: 2020-01-01 | Stop reason: HOSPADM

## 2020-01-01 RX ORDER — SODIUM CHLORIDE 0.9 % (FLUSH) 0.9 %
5-40 SYRINGE (ML) INJECTION EVERY 8 HOURS
Status: DISCONTINUED | OUTPATIENT
Start: 2020-01-01 | End: 2020-01-01 | Stop reason: HOSPADM

## 2020-01-01 RX ORDER — VANCOMYCIN/0.9 % SOD CHLORIDE 1.5G/250ML
1500 PLASTIC BAG, INJECTION (ML) INTRAVENOUS ONCE
Status: DISCONTINUED | OUTPATIENT
Start: 2020-01-01 | End: 2020-01-01 | Stop reason: SDUPTHER

## 2020-01-01 RX ADMIN — PIPERACILLIN AND TAZOBACTAM 3.38 G: 3; .375 INJECTION, POWDER, LYOPHILIZED, FOR SOLUTION INTRAVENOUS at 04:45

## 2020-01-01 RX ADMIN — Medication 10 ML: at 13:30

## 2020-01-01 RX ADMIN — PIPERACILLIN AND TAZOBACTAM 3.38 G: 3; .375 INJECTION, POWDER, LYOPHILIZED, FOR SOLUTION INTRAVENOUS at 20:42

## 2020-01-01 RX ADMIN — SODIUM CHLORIDE, SODIUM LACTATE, POTASSIUM CHLORIDE, AND CALCIUM CHLORIDE 150 ML/HR: 600; 310; 30; 20 INJECTION, SOLUTION INTRAVENOUS at 11:51

## 2020-01-01 RX ADMIN — SODIUM CHLORIDE, POTASSIUM CHLORIDE, SODIUM LACTATE AND CALCIUM CHLORIDE 1000 ML: 600; 310; 30; 20 INJECTION, SOLUTION INTRAVENOUS at 16:32

## 2020-01-01 RX ADMIN — PIPERACILLIN AND TAZOBACTAM 3.38 G: 3; .375 INJECTION, POWDER, LYOPHILIZED, FOR SOLUTION INTRAVENOUS at 20:32

## 2020-01-01 RX ADMIN — Medication 10 ML: at 13:32

## 2020-01-01 RX ADMIN — PIPERACILLIN AND TAZOBACTAM 3.38 G: 3; .375 INJECTION, POWDER, LYOPHILIZED, FOR SOLUTION INTRAVENOUS at 13:00

## 2020-01-01 RX ADMIN — PIPERACILLIN AND TAZOBACTAM 3.38 G: 3; .375 INJECTION, POWDER, LYOPHILIZED, FOR SOLUTION INTRAVENOUS at 03:46

## 2020-01-01 RX ADMIN — Medication 10 ML: at 22:47

## 2020-01-01 RX ADMIN — VANCOMYCIN HYDROCHLORIDE 1000 MG: 1 INJECTION, POWDER, LYOPHILIZED, FOR SOLUTION INTRAVENOUS at 10:57

## 2020-01-01 RX ADMIN — Medication 10 ML: at 16:25

## 2020-01-01 RX ADMIN — TAMSULOSIN HYDROCHLORIDE 0.4 MG: 0.4 CAPSULE ORAL at 10:57

## 2020-01-01 RX ADMIN — TAMSULOSIN HYDROCHLORIDE 0.4 MG: 0.4 CAPSULE ORAL at 08:14

## 2020-01-01 RX ADMIN — VANCOMYCIN HYDROCHLORIDE 1000 MG: 1 INJECTION, POWDER, LYOPHILIZED, FOR SOLUTION INTRAVENOUS at 22:43

## 2020-01-01 RX ADMIN — HEPARIN SODIUM 5000 UNITS: 5000 INJECTION INTRAVENOUS; SUBCUTANEOUS at 22:47

## 2020-01-01 RX ADMIN — PIPERACILLIN AND TAZOBACTAM 3.38 G: 3; .375 INJECTION, POWDER, LYOPHILIZED, FOR SOLUTION INTRAVENOUS at 03:18

## 2020-01-01 RX ADMIN — Medication 10 ML: at 21:05

## 2020-01-01 RX ADMIN — POTASSIUM CHLORIDE 40 MEQ: 750 TABLET, EXTENDED RELEASE ORAL at 14:51

## 2020-01-01 RX ADMIN — IOPAMIDOL 40 ML: 612 INJECTION, SOLUTION INTRAVENOUS at 16:00

## 2020-01-01 RX ADMIN — FENTANYL CITRATE 25 MCG: 50 INJECTION, SOLUTION INTRAMUSCULAR; INTRAVENOUS at 16:07

## 2020-01-01 RX ADMIN — PIPERACILLIN AND TAZOBACTAM 3.38 G: 3; .375 INJECTION, POWDER, LYOPHILIZED, FOR SOLUTION INTRAVENOUS at 13:30

## 2020-01-01 RX ADMIN — SODIUM CHLORIDE, SODIUM LACTATE, POTASSIUM CHLORIDE, AND CALCIUM CHLORIDE 150 ML/HR: 600; 310; 30; 20 INJECTION, SOLUTION INTRAVENOUS at 05:24

## 2020-01-01 RX ADMIN — Medication 10 ML: at 04:02

## 2020-01-01 RX ADMIN — Medication 300 UNITS: at 16:16

## 2020-01-01 RX ADMIN — SODIUM CHLORIDE, SODIUM LACTATE, POTASSIUM CHLORIDE, AND CALCIUM CHLORIDE 150 ML/HR: 600; 310; 30; 20 INJECTION, SOLUTION INTRAVENOUS at 22:43

## 2020-01-01 RX ADMIN — SODIUM CHLORIDE, SODIUM LACTATE, POTASSIUM CHLORIDE, AND CALCIUM CHLORIDE 100 ML/HR: 600; 310; 30; 20 INJECTION, SOLUTION INTRAVENOUS at 06:19

## 2020-01-01 RX ADMIN — Medication 10 ML: at 07:13

## 2020-01-01 RX ADMIN — SODIUM CHLORIDE, SODIUM LACTATE, POTASSIUM CHLORIDE, AND CALCIUM CHLORIDE 150 ML/HR: 600; 310; 30; 20 INJECTION, SOLUTION INTRAVENOUS at 06:05

## 2020-01-01 RX ADMIN — SODIUM CHLORIDE, SODIUM LACTATE, POTASSIUM CHLORIDE, AND CALCIUM CHLORIDE 50 ML/HR: 600; 310; 30; 20 INJECTION, SOLUTION INTRAVENOUS at 20:53

## 2020-01-01 RX ADMIN — VANCOMYCIN HYDROCHLORIDE 500 MG: 500 INJECTION, POWDER, LYOPHILIZED, FOR SOLUTION INTRAVENOUS at 18:55

## 2020-01-01 RX ADMIN — FENTANYL CITRATE 25 MCG: 50 INJECTION, SOLUTION INTRAMUSCULAR; INTRAVENOUS at 16:00

## 2020-01-01 RX ADMIN — TAMSULOSIN HYDROCHLORIDE 0.4 MG: 0.4 CAPSULE ORAL at 10:41

## 2020-01-01 RX ADMIN — CEFEPIME HYDROCHLORIDE 2 G: 2 INJECTION, POWDER, FOR SOLUTION INTRAVENOUS at 16:22

## 2020-01-01 RX ADMIN — HEPARIN SODIUM 5000 UNITS: 5000 INJECTION INTRAVENOUS; SUBCUTANEOUS at 13:32

## 2020-01-01 RX ADMIN — TAMSULOSIN HYDROCHLORIDE 0.4 MG: 0.4 CAPSULE ORAL at 10:31

## 2020-01-01 RX ADMIN — Medication 10 ML: at 23:21

## 2020-01-01 RX ADMIN — PIPERACILLIN AND TAZOBACTAM 3.38 G: 3; .375 INJECTION, POWDER, LYOPHILIZED, FOR SOLUTION INTRAVENOUS at 20:23

## 2020-01-01 RX ADMIN — FENTANYL CITRATE 25 MCG: 50 INJECTION, SOLUTION INTRAMUSCULAR; INTRAVENOUS at 16:10

## 2020-01-01 RX ADMIN — LEVOFLOXACIN 750 MG: 750 INJECTION, SOLUTION INTRAVENOUS at 17:10

## 2020-01-01 RX ADMIN — SODIUM CHLORIDE, SODIUM LACTATE, POTASSIUM CHLORIDE, AND CALCIUM CHLORIDE 150 ML/HR: 600; 310; 30; 20 INJECTION, SOLUTION INTRAVENOUS at 12:53

## 2020-01-01 RX ADMIN — PIPERACILLIN AND TAZOBACTAM 3.38 G: 3; .375 INJECTION, POWDER, LYOPHILIZED, FOR SOLUTION INTRAVENOUS at 04:00

## 2020-01-01 RX ADMIN — VANCOMYCIN HYDROCHLORIDE 1000 MG: 1 INJECTION, POWDER, LYOPHILIZED, FOR SOLUTION INTRAVENOUS at 18:55

## 2020-01-01 RX ADMIN — PIPERACILLIN AND TAZOBACTAM 3.38 G: 3; .375 INJECTION, POWDER, LYOPHILIZED, FOR SOLUTION INTRAVENOUS at 11:54

## 2020-01-01 RX ADMIN — Medication 10 ML: at 21:03

## 2020-01-01 RX ADMIN — Medication 10 ML: at 06:38

## 2020-01-01 RX ADMIN — MIDAZOLAM HYDROCHLORIDE 1 MG: 1 INJECTION, SOLUTION INTRAMUSCULAR; INTRAVENOUS at 15:45

## 2020-01-01 RX ADMIN — Medication 10 ML: at 14:00

## 2020-01-01 RX ADMIN — MORPHINE SULFATE 2 MG: 2 INJECTION, SOLUTION INTRAMUSCULAR; INTRAVENOUS at 21:05

## 2020-01-01 RX ADMIN — Medication 10 ML: at 06:05

## 2020-01-01 RX ADMIN — PIPERACILLIN AND TAZOBACTAM 3.38 G: 3; .375 INJECTION, POWDER, LYOPHILIZED, FOR SOLUTION INTRAVENOUS at 20:47

## 2020-01-01 RX ADMIN — TAMSULOSIN HYDROCHLORIDE 0.4 MG: 0.4 CAPSULE ORAL at 09:13

## 2020-01-01 RX ADMIN — PIPERACILLIN AND TAZOBACTAM 3.38 G: 3; .375 INJECTION, POWDER, LYOPHILIZED, FOR SOLUTION INTRAVENOUS at 13:32

## 2020-01-01 RX ADMIN — HEPARIN SODIUM 5000 UNITS: 5000 INJECTION INTRAVENOUS; SUBCUTANEOUS at 06:30

## 2020-01-01 RX ADMIN — FENTANYL CITRATE 50 MCG: 50 INJECTION, SOLUTION INTRAMUSCULAR; INTRAVENOUS at 15:30

## 2020-01-01 RX ADMIN — Medication 3 MG: at 02:08

## 2020-01-01 RX ADMIN — IOPAMIDOL 80 ML: 755 INJECTION, SOLUTION INTRAVENOUS at 13:00

## 2020-01-01 RX ADMIN — PIPERACILLIN AND TAZOBACTAM 3.38 G: 3; .375 INJECTION, POWDER, LYOPHILIZED, FOR SOLUTION INTRAVENOUS at 12:28

## 2020-01-01 RX ADMIN — PIPERACILLIN AND TAZOBACTAM 3.38 G: 3; .375 INJECTION, POWDER, LYOPHILIZED, FOR SOLUTION INTRAVENOUS at 04:02

## 2020-01-01 RX ADMIN — FENTANYL CITRATE 50 MCG: 50 INJECTION, SOLUTION INTRAMUSCULAR; INTRAVENOUS at 15:40

## 2020-01-01 RX ADMIN — TAMSULOSIN HYDROCHLORIDE 0.4 MG: 0.4 CAPSULE ORAL at 08:53

## 2020-01-01 RX ADMIN — SODIUM CHLORIDE, SODIUM LACTATE, POTASSIUM CHLORIDE, AND CALCIUM CHLORIDE 150 ML/HR: 600; 310; 30; 20 INJECTION, SOLUTION INTRAVENOUS at 04:00

## 2020-01-01 RX ADMIN — LIDOCAINE HYDROCHLORIDE 400 MG: 20 INJECTION, SOLUTION INFILTRATION; PERINEURAL at 17:00

## 2020-01-01 RX ADMIN — FENTANYL CITRATE 25 MCG: 50 INJECTION, SOLUTION INTRAMUSCULAR; INTRAVENOUS at 16:25

## 2020-01-01 RX ADMIN — PIPERACILLIN AND TAZOBACTAM 3.38 G: 3; .375 INJECTION, POWDER, LYOPHILIZED, FOR SOLUTION INTRAVENOUS at 20:30

## 2020-01-01 RX ADMIN — MIDAZOLAM HYDROCHLORIDE 1 MG: 1 INJECTION, SOLUTION INTRAMUSCULAR; INTRAVENOUS at 15:35

## 2020-01-01 RX ADMIN — MIDAZOLAM HYDROCHLORIDE 1 MG: 1 INJECTION, SOLUTION INTRAMUSCULAR; INTRAVENOUS at 16:01

## 2020-01-01 RX ADMIN — Medication 10 ML: at 05:25

## 2020-01-01 RX ADMIN — PIPERACILLIN AND TAZOBACTAM 3.38 G: 3; .375 INJECTION, POWDER, LYOPHILIZED, FOR SOLUTION INTRAVENOUS at 04:05

## 2020-01-01 RX ADMIN — OXYCODONE 5 MG: 5 TABLET ORAL at 23:47

## 2020-01-01 RX ADMIN — MIDAZOLAM HYDROCHLORIDE 1 MG: 1 INJECTION, SOLUTION INTRAMUSCULAR; INTRAVENOUS at 16:10

## 2020-01-01 RX ADMIN — SODIUM CHLORIDE 1000 ML: 9 INJECTION, SOLUTION INTRAVENOUS at 15:54
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
## 2020-01-27 NOTE — Clinical Note
1/28/20 Patient: Иван Aguilar YOB: 1975 Date of Visit: 1/27/2020 Rusty Saxena MD 
[address] [address] [address] VIA In Basket Dear Rusty Saxena MD, Thank you for referring Mr. Иван Aguilar to [address] [place] [address] for evaluation. My notes for this consultation are attached. If you have questions, please do not hesitate to call me. I look forward to following your patient along with you.  
 
 
Sincerely, 
 
Elder Tesfaye MD

## 2020-02-04 NOTE — TELEPHONE ENCOUNTER
Spoke with patient informed him date of PET scan Thursday 02/13/20 @ 0700 with arrival time 0630 & appointment with Dr. lEaine Holloway 02/19/2020 @ 1330, may change to 02/17/20 after review notes. Express understanding.

## 2020-02-04 NOTE — TELEPHONE ENCOUNTER
Spoke with patient regarding biopsy results. The right neck lymph nodes were positive for a poorly differentiated malignant neoplasm. Squamous cell carcinoma is a possibility. The patient is a smoker. Has not noticed any oral lesions. Will refer to Dr. Marc Tyler. Have a call in to him to see what imaging studies he wants prior tot he visit.
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
## 2020-02-19 NOTE — PROGRESS NOTES
Sent off for additional testing on pathology,HPV.   HUSSEIN FROM DR Zuniga Fruits REFERRAL TO [facility]
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
## 2020-03-03 NOTE — PROGRESS NOTES
Pt going for 2nd opinion on 3/12/2020 at [facility] then will compare notes and call us back with decision.

## 2020-04-24 NOTE — PROGRESS NOTES
Called patient to follow up referrals place. Patient stated he is now being treated at [address] [place] [address]. Referrals closed.

## 2020-04-24 NOTE — PROGRESS NOTES
HISTORY OF PRESENT ILLNESS Debbie Early. is a 40 y.o. male. HPI 
 
ROS Physical Exam 
 
ASSESSMENT and PLAN 
{ASSESSMENT/PLAN:64305}

## 2020-12-22 PROBLEM — A41.9 SEPSIS (HCC): Status: ACTIVE | Noted: 2020-01-01

## 2020-12-22 NOTE — ED PROVIDER NOTES
77-year-old male with history of squamous cell carcinoma found in the neck in February of this year is here with a leaking nephrostomy tube on the right side after recent placement at the [address] [place] [address] Road [facility]. He says the cancer has spread to his bones and around his kidneys and he was told by [facility] that he would have a couple months to a year to live. He then went to the [address] [facility] and was told they might treat this more aggressively. He says they are going to save his life. He just got back from [facility] 2 days ago and is continuing to lose weight despite having tube feeds and drinking and eating a small amount by mouth. He has had some diarrhea, but no recent antibiotics. He attributes this to the tube feeds. No headaches or chest pain or trouble breathing or abdominal pain. He says he does have a new blind spot in his left eye and it is hard for him to see me if he closes his right eye. No recent chemotherapy or radiation. He had kidney injury, so they were unable to start immunotherapy. The plan is for him to be home for the holidays and then go back to [facility] next week. Past Medical History:  
Diagnosis Date  Cancer ([facility] [address] No past surgical history on file. Family History:  
Problem Relation Age of Onset  Breast Cancer Mother  Diabetes Mother  Cancer Mother   
     breast  
 Heart Disease Father  Hypertension Father  Stroke Father Social History Socioeconomic History  Marital status:  Spouse name: Not on file  Number of children: Not on file  Years of education: Not on file  Highest education level: Not on file Occupational History  Not on file Social Needs  Financial resource strain: Not on file  Food insecurity Worry: Not on file Inability: Not on file  Transportation needs Medical: Not on file Non-medical: Not on file Tobacco Use  Smoking status: Former Smoker Years: 20.00 Types: Cigars Quit date: 2020 Years since quittin.9  Smokeless tobacco: Never Used Substance and Sexual Activity  Alcohol use: Yes Alcohol/week: 5.0 standard drinks Types: 5 Standard drinks or equivalent per week Comment: weekends  Drug use: Yes Types: Marijuana  Sexual activity: Yes  
  Partners: Female Lifestyle  Physical activity Days per week: Not on file Minutes per session: Not on file  Stress: Not on file Relationships  Social connections Talks on phone: Not on file Gets together: Not on file Attends Christianity service: Not on file Active member of club or organization: Not on file Attends meetings of clubs or organizations: Not on file Relationship status: Not on file  Intimate partner violence Fear of current or ex partner: Not on file Emotionally abused: Not on file Physically abused: Not on file Forced sexual activity: Not on file Other Topics Concern  Not on file Social History Narrative  Not on file ALLERGIES: Patient has no known allergies. Review of Systems Constitutional: Positive for unexpected weight change. Negative for fever. HENT: Negative for trouble swallowing. Eyes: Negative for visual disturbance. Respiratory: Negative for cough. Cardiovascular: Negative for chest pain. Gastrointestinal: Negative for abdominal pain. Genitourinary: Negative for penile pain. Musculoskeletal: Negative for gait problem. Skin: Negative for rash. Neurological: Negative for headaches. Hematological: Does not bruise/bleed easily. Psychiatric/Behavioral: Negative for sleep disturbance. Vitals:  
 20 1451 BP: (!) 138/96 Pulse: (!) (P) 121 SpO2: 97% Physical Exam 
Constitutional:   
   Appearance: Normal appearance. He is underweight. He is ill-appearing. HENT:  
   Head: Normocephalic.   
   Nose: Nose normal.  
 Mouth/Throat:  
   Mouth: Mucous membranes are dry. Eyes:  
   Extraocular Movements: Extraocular movements intact. Conjunctiva/sclera: Conjunctivae normal.  
Cardiovascular:  
   Rate and Rhythm: Tachycardia present. Pulmonary:  
   Effort: Pulmonary effort is normal. No respiratory distress. Abdominal:  
   General: Abdomen is flat. Palpations: Abdomen is soft. Comments: PEG tube Genitourinary: 
   Comments: Nephrostomies with dressings that have yellow/brown drainage. No dripping of fluids and no bleeding Musculoskeletal: Normal range of motion. Skin: 
   Findings: No rash. Neurological:  
   General: No focal deficit present. Mental Status: He is alert. Psychiatric:     
   Behavior: Behavior normal.  
 
  
 
MDM Number of Diagnoses or Management Options Bony metastasis ([address] Hepatic metastases ([address] Malfunction of nephrostomy tube ([address] Malignant ascites Metastatic squamous cell carcinoma ([address] Pleural effusion Sepsis, due to unspecified organism, unspecified whether acute organ dysfunction present ([address] Diagnosis management comments: EKG at 1508 Sinus tachycardia with short MT and normal axis at a rate of 107 bpm 
QRS and QTc within normal limits No ST changes Perfect Serve Consult for Admission 5:16 PM 
 
ED Room Number: SER07/07 Patient Name and age:  Clifton Bocanegra. 39 y.o.  male Working Diagnosis: Metastatic squamous cell carcinoma ([address]  (primary encounter diagnosis) Malfunction of nephrostomy tube ([address] Sepsis, due to unspecified organism, unspecified whether acute organ dysfunction present ([address] Pleural effusion Hepatic metastases ([address] Malignant ascites Bony metastasis ([address] COVID-19 Suspicion:  no 
Sepsis present:  yes  Reassessment needed: no 
Code Status:  Full Code Readmission: no 
Isolation Requirements:  Other Recommended Level of Care:  telemetry Department:[facility] - [phone] Other: Patient with diffuse metastatic disease, was told by [facility] he only had 2 months to 1 year to live, but is still full code and is pursuing aggressive treatment through the [facility]. He was just sent home for the holidays after they placed nephrostomy tubes and he was here because they were leaking. He was found to be tachycardic, cachectic, with an elevated temperature and a white count of 35,000. I have gotten cultures and treated with empiric broad-spectrum antibiotics. I spoke with urology, but the urologist told me interventional radiology usually deals with the nephrostomy tubes. IMPRESSION: 
Metastatic squamous cell carcinoma (HCC)  (primary encounter diagnosis) Malfunction of nephrostomy tube ([address] Sepsis, due to unspecified organism, unspecified whether acute organ dysfunction present ([address] Pleural effusion Hepatic metastases ([address] Malignant ascites Bony metastasis ([address] - Broad Spectrum Antibiotics ordered: Cefepime, Vancomycin and  Levofloxacin - Repeat lactic acid ordered for time 1800 
- Re-assessment performed at time 1710 and clinical condition improving.   
- Hypotension or Lactic Acidosis present (SBP<90, MAP<65, Lactate >4): NO IVF:  30cc/kg actual Body Weight - Persistent Hypotension despite IVF resuscitation: NO  Vasopressors: Not indicated due to Septic Shock not present Plan: 
Admit to Telemetry Total critical care time spent exclusive of procedures:  36 minutes Myra Claude,  
 
 
 
  
 
Procedures

## 2020-12-22 NOTE — ED TRIAGE NOTES
Pt has a tube in his right and left kidney and the right one is leaking, pt called the doctor that placed them and he was told to come to the ER to get it checked out. Pt denies nay pain. Pt noticed it was leaking this morning.

## 2020-12-23 NOTE — H&P
[address]  HISTORY AND PHYSICAL Name:  Stanislaw Neil 
MR#:  [id number] :  1975 ACCOUNT #:  [de-identified] ADMIT DATE:  2020 The patient was seen, evaluated, and admitted by me on 2020. PRIMARY CARE PHYSICIAN:  Nahid Gonzales MD 
 
SOURCE OF INFORMATION:  The patient and review of ED medical records. CHIEF COMPLAINT:  Leaking nephrostomy tube. HISTORY OF PRESENT ILLNESS:  This is a 49-year-old man with a past medical history significant for squamous cell carcinoma of the head and neck, who was in his usual state of health until the day of presentation at the emergency room when the patient noticed that there is a leak coming from the site of the right nephrostomy tube. The patient went to [facility] Emergency Room at [facility] for further evaluation. In 02/2020, the patient presented with right neck swelling. Subsequent evaluation revealed lymphadenopathy of the neck, biopsy was obtained with subsequent diagnosis of squamous cell carcinoma. The patient has been receiving treatment at [facility], which included radiation and chemotherapy. He was recently told by [facility] that the squamous cell carcinoma of the neck has metastasized and that he has a couple of months to live. The patient then stopped going to [facility] and went to the [facility] for further treatment of his cancer. When the patient arrived at the [facility], he was found to have a urinary obstruction, bilateral.  The patient underwent placement of bilateral nephrostomy tube and the patient is yet to start immunotherapy, which was the reason why the patient went to the [facility], because of the kidney problem. The patient is back home in [place] for now and noted that there was a leakage from the site of right nephrostomy tube placement, because of that, he came to the emergency room. When the patient arrived at the emergency room, the patient has significant leukocytosis and his clinical presentation triggered code sepsis. The code sepsis was initiated including administration of broad-spectrum antibiotics. The patient was then referred to the hospitalist service for evaluation for admission. No record of prior admission to this hospital. 
 
PAST MEDICAL HISTORY:  Squamous cell carcinoma of the head and neck. ALLERGIES:  NO KNOWN DRUG ALLERGIES. MEDICATIONS: 
1.  Multivitamin 1 tablet daily. 2.  Zofran 8 mg every 8 hours as needed for nausea. 3.  Phenergan 10 mg four times daily as needed for nausea. 4.  Flomax 0.4 mg daily. FAMILY HISTORY:  This was reviewed. His mother has breast cancer, diabetes. His father had stroke, hypertension, and heart disease. PAST SURGICAL HISTORY:  Not significant. SOCIAL HISTORY:  The patient is a former smoker, quit tobacco abuse in 01/2020. Admits to social consumption of alcohol and use of marijuana. REVIEW OF SYSTEMS: 
HEAD, EYES, EARS, NOSE, AND THROAT:  No headache, no dizziness, no blurring of vision, no photophobia. RESPIRATORY SYSTEM:  No cough, no shortness of breath, no hemoptysis. CARDIOVASCULAR SYSTEM:  No chest pain, no orthopnea, no palpitation. GASTROINTESTINAL SYSTEM:  No nausea or vomiting. No diarrhea. No constipation. GENITOURINARY SYSTEM:  No dysuria, no urgency, and no frequency. All other systems are reviewed and they are negative. PHYSICAL EXAMINATION: 
GENERAL APPEARANCE:  The patient appeared ill, cachectic and in moderate distress. VITAL SIGNS:  On arrival at the emergency room, temperature 99.1, pulse 121, respiratory rate 25, blood pressure 138/95, oxygen saturation 97% on room air. HEENT:  Head:  Normocephalic, atraumatic. Eyes:  Normal eye movement. No redness, no drainage, no discharge. Ears:  Normal external ears with no evidence of drainage. Nose:  No deformity and no drainage. Mouth and Throat:  No visible oral lesion. Dry oral mucosa. NECK:  Neck is supple. No JVD. No thyromegaly. CHEST:  Reduced breath sounds bilaterally. No crackles. HEART:  Normal S1 and S2, regular. No clinically appreciable murmur. ABDOMEN:  Soft, nontender. PEG tube noted. Normal bowel sounds. Bilateral nephrostomy tube with leakage from the site of insertion noted and present on admission. CNS:  Alert and oriented x3. No gross focal neurological deficit. EXTREMITIES:  No edema. Pulses 2+ bilaterally. MUSCULOSKELETAL SYSTEM:  No evidence of joint deformity or swelling. SKIN:  Discoloration in right side of the neck due to radiation therapy noted and present on admission. PSYCHIATRY:  Normal mood and affect. LYMPHATIC SYSTEM:  No cervical lymphadenopathy. DIAGNOSTIC DATA:  EKG shows sinus tachycardia and nonspecific ST abnormalities. CT scan of the head without contrast, no acute abnormalities. CT scan of the abdomen and pelvis without contrast shows new hepatic metastasis, new bony metastasis, new bilateral pleural effusions, right greater than left. Chest x-ray shows bilateral pleural effusion and bibasilar compressive atelectasis. LABORATORY DATA:  Lactic acid level 3.5. Chemistry:  Sodium 142, potassium 3.7, chloride 102, CO2 25, glucose 84, BUN 27, creatinine 1.15, calcium 8.9, total bilirubin 0.3, ALT 52, , alkaline phosphatase 405, total protein 6.6, albumin level 2.4, globulin at 4.2. Hematology:  WBC 35.0, hemoglobin at 9.5, hematocrit 29.8, platelets 822. Urinalysis: This is significant for large blood, positive nitrites, moderate leukocyte esterase, 1+ bacteria. ASSESSMENT: 
1.  Sepsis. 2.  Metastatic squamous cell carcinoma. 3.  Malfunction of nephrostomy tube. 4.  Acute cystitis with hematuria. 5.  Anemia. 6.  Bilateral pleural effusion, right greater than left. 7.  Thrombocytopenia. 8.  Elevated blood pressure. PLAN: 
1. Sepsis. We will admit the patient for further evaluation and treatment. We will start the patient on vancomycin and Zosyn. The sepsis is most likely coming from the acute cystitis with hematuria. We will await blood culture results. We will obtain a CT scan of the chest to evaluate the patient for pneumonia as another possible source of sepsis. We will continue other supportive therapy including fluid therapy. 2.  Metastatic squamous cell carcinoma of the head and neck. This is with metastasis to the bone and liver. Oncology consult will be requested to assist in further evaluation and treatment. Palliative care consult will also be requested to address treatment goals. We will continue other supportive therapy including tube feeding. The patient has a PEG tube for the tube feeding while awaiting further recommendation from the oncologist. 
3.  Malfunction of nephrostomy tube. Urologist on-call was consulted by the emergency room physician and advised consultation with interventional radiologist for nephrostomy tube replacement. Interventional radiologist will be consulted for that purpose. CT scan of the abdomen and pelvis did not reveal any clear obstruction. 4.  Acute cystitis with hematuria. As stated above, this is a potential source of sepsis. The patient will be started on antibiotics as stated above and we will await urine culture. 5.  Anemia. This is most likely due to the patient's squamous cell carcinoma of the head and neck, but at the same time we will carry out anemia workup including checking a stool guaiac to rule out occult GI bleed. 6.  Bilateral pleural effusion, right greater than left. We will obtain CT scan of the chest for further evaluation of the pleural effusion. The patient may require Thoracic Surgery consult to assist in further evaluation and treatment. 7.  Thrombocytopenia. This is mild. The patient is asymptomatic. We will continue to monitor the patient's platelet count. 8.  Elevated blood pressure. The patient is not on any antihypertensive medication. We will continue to monitor the patient's blood pressure and avoid antihypertensive medication at this time in the setting of sepsis. We will check TSH level. 9.  Other Issues:  Code Status: The patient is a full code. We will place the patient on heparin for DVT prophylaxis, but if there is further significant drop in the patient's platelet count, we will discontinue heparin and request SCD for DVT prophylaxis. SEPSIS REASSESSMENT COMPLETED:  The patient has normal capillary refill, improved cardiopulmonary examination, and moist mucous membrane. FUNCTIONAL STATUS PRIOR TO ADMISSION:  The patient came from home. The patient is ambulatory with no assistant or device. COVID PRECAUTION:  The patient was wearing a face mask. I was wearing a face mask and gloves for this patient's encounter. Monserrat Wright MD 
 
 
[id number] 
D:  12/23/2020 5:19 
T:  12/23/2020 6:43 JOB #:  [id number] CC:  Alisa Patel MD

## 2020-12-23 NOTE — H&P
Radiology History and Physical 
 
Patient: Nica Foster. 39 y.o. male Chief Complaint: Leakage around bilateral nephroureteral stents History of Present Illness: 39year old male with bilateral nephroureteral stents, placed 2 weeks ago at a different facility. Over the past few days has had leakage of urine around the tubes, soaking the dressings. No pain. No concerns for infection. Urine does still drain into bags. History: 
 
Past Medical History:  
Diagnosis Date  Cancer ([facility] [address] Family History Problem Relation Age of Onset  Breast Cancer Mother  Diabetes Mother  Cancer Mother   
     breast  
 Heart Disease Father  Hypertension Father  Stroke Father Social History Socioeconomic History  Marital status:  Spouse name: Not on file  Number of children: Not on file  Years of education: Not on file  Highest education level: Not on file Occupational History  Not on file Social Needs  Financial resource strain: Not on file  Food insecurity Worry: Not on file Inability: Not on file  Transportation needs Medical: Not on file Non-medical: Not on file Tobacco Use  Smoking status: Former Smoker Years: 20.00 Types: Cigars Quit date: 2020 Years since quittin.9  Smokeless tobacco: Never Used Substance and Sexual Activity  Alcohol use: Yes Alcohol/week: 5.0 standard drinks Types: 5 Standard drinks or equivalent per week Comment: weekends  Drug use: Yes Types: Marijuana  Sexual activity: Yes  
  Partners: Female Lifestyle  Physical activity Days per week: Not on file Minutes per session: Not on file  Stress: Not on file Relationships  Social connections Talks on phone: Not on file Gets together: Not on file Attends Alevism service: Not on file Active member of club or organization: Not on file Attends meetings of clubs or organizations: Not on file Relationship status: Not on file  Intimate partner violence Fear of current or ex partner: Not on file Emotionally abused: Not on file Physically abused: Not on file Forced sexual activity: Not on file Other Topics Concern  Not on file Social History Narrative  Not on file Allergies: No Known Allergies Current Medications: 
Current Facility-Administered Medications Medication Dose Route Frequency  melatonin tablet 3 mg  3 mg Oral QHS PRN  
 tamsulosin (FLOMAX) capsule 0.4 mg  0.4 mg Oral DAILY  sodium chloride (NS) flush 5-40 mL  5-40 mL IntraVENous Q8H  
 sodium chloride (NS) flush 5-40 mL  5-40 mL IntraVENous PRN  
 acetaminophen (TYLENOL) tablet 650 mg  650 mg Oral Q6H PRN Or  
 acetaminophen (TYLENOL) suppository 650 mg  650 mg Rectal Q6H PRN  polyethylene glycol (MIRALAX) packet 17 g  17 g Oral DAILY PRN  promethazine (PHENERGAN) tablet 12.5 mg  12.5 mg Oral Q6H PRN Or  
 ondansetron (ZOFRAN) injection 4 mg  4 mg IntraVENous Q6H PRN  
 heparin (porcine) injection 5,000 Units  5,000 Units SubCUTAneous Q8H  
 vancomycin (VANCOCIN) 1,000 mg in 0.9% sodium chloride 250 mL (VIAL-MATE)  1 g IntraVENous Q12H  piperacillin-tazobactam (ZOSYN) 3.375 g in 0.9% sodium chloride (MBP/ADV) 100 mL MBP  3.375 g IntraVENous Q8H  
 lactated Ringers infusion  150 mL/hr IntraVENous CONTINUOUS  
 vancomycin dosing per pharmacy    Other Rx Dosing/Monitoring  iopamidoL (ISOVUE-370) 76 % injection 100 mL  100 mL IntraVENous RAD ONCE  
 ceFAZolin (ANCEF) 2 g in sterile water (preservative free) 20 mL IV syringe  2 g IntraVENous ONCE  
 midazolam (PF) (VERSED) injection 5 mg  5 mg IntraVENous Multiple  fentaNYL citrate (PF) injection 200 mcg  200 mcg IntraVENous Multiple  0.9% sodium chloride infusion 500 mL  500 mL IntraVENous CONTINUOUS  
  sodium chloride (NS) flush 5-10 mL  5-10 mL IntraVENous PRN Facility-Administered Medications Ordered in Other Encounters Medication Dose Route Frequency  lidocaine (XYLOCAINE) 20 mg/mL (2 %) injection 400 mg  20 mL SubCUTAneous RAD ONCE  
 iopamidoL (ISOVUE 300) 61 % contrast injection 100 mL  100 mL IntraCATHeter RAD ONCE Physical Exam: 
Blood pressure 125/86, pulse (!) 104, temperature 98.4 °F (36.9 °C), resp. rate 22, height 6' 0.01\" (1.829 m), weight 56.5 kg (124 lb 9 oz), SpO2 97 %. GENERAL: alert, cooperative, no distress, appears stated age, LUNG: Nonlabored respiration on room air HEART: regular rate and rhythm Bilateral flank nephroureteral stents, with dressing soaked in urine Alerts:   
Hospital Problems  Date Reviewed: 12/23/2020 Codes Class Noted POA * (Principal) Sepsis ([facility] [address] ICD-10-CM: A41.9 ICD-9-CM: 038.9, 995.91  12/22/2020 Yes Laboratory:     
Recent Labs  
  12/23/20 
0357 HGB 8.0*  
HCT 24.8* WBC 34.4*  
* INR 1.5*  
BUN 30* CREA 0.96  
K 3.9 Plan of Care/Planned Procedure: 
Risks, benefits, and alternatives reviewed with patient and he agrees to proceed with the procedure. Bilateral NUS exchange Deemed appropriate for moderate sedation with versed and fentanyl. Yue Patricio MD 
Interventional Radiology [address] [place] [address] [facility] 
3:12 PM, 12/23/2020

## 2020-12-23 NOTE — ACP (ADVANCE CARE PLANNING)
Visited pt in response to an In-Basket request to assist with an Advance Medical Directive (AMD). Pt is a Palliative Medicine consult and goals of care will be addressed by Palliative team. At time of visit pt was sleeping and did not awake. Elvan Schwab, Chaplain, MDiv, MS, [facility] 
[phone] [phone])

## 2020-12-23 NOTE — PROGRESS NOTES
Pharmacist Note - Vancomycin Dosing Consult provided for this 39 y.o. male for indication of sepsis. Antibiotic regimen(s): zosyn Patient on vancomycin PTA? YES - loading dose (1500mg ) received at [facility] on  ~1900 Recent Labs  
  20 
1523 WBC 35.0*  
CREA 1.15  
BUN 27* Frequency of BMP: daily x  3 then every other day Height: 183 cm Weight: 63 kg Est CrCl: 70 ml/min Temp (24hrs), Av.8 °F (37.1 °C), Min:98.4 °F (36.9 °C), Max:99.1 °F (37.3 °C) Cultures:pending Goal trough = 15 - 20 mcg/mL Therapy was initiated prior to arrival at [facility] with a loading dose of 1500 mg IV x 1 to be followed by a maintenance dose of 1g IV every 12 hours. Pharmacy to follow patient daily and order levels / make dose adjustments as appropriate.

## 2020-12-23 NOTE — ED NOTES
TRANSFER - OUT REPORT: 
 
Verbal report given to Tabatha Mclaughlin RN(name) on Naresh Bello Jr.  being transferred to [facility](unit) for routine progression of care    
 
Report consisted of patient’s Situation, Background, Assessment and  
Recommendations(SBAR).  
 
Information from the following report(s) SBAR, ED Summary, MAR, Recent Results and Cardiac Rhythm sinus tach was reviewed with the receiving nurse. 
 
Lines:  
Peripheral IV 12/22/20 Right Antecubital (Active)  
Site Assessment Clean, dry, & intact 12/22/20 1528  
Phlebitis Assessment 0 12/22/20 1528  
Infiltration Assessment 0 12/22/20 1528  
Dressing Status Clean, dry, & intact 12/22/20 1528  
Dressing Type Transparent 12/22/20 1528  
Hub Color/Line Status Pink 12/22/20 1528  
Action Taken Blood drawn 12/22/20 1528  
  
 
Opportunity for questions and clarification was provided.   
 
Patient transported with: 
 Monitor

## 2020-12-23 NOTE — CONSULTS
Consult received, events noted. Patient off floor, presumably having nephrostomy tubes exchanged. Seen initially by Dr. Teressa Kemp, then transferred to [address] [place] [address], now with widely metastatic disease admitted with infection of the urinary tract. Will attempt to see again in AM. Thank you for consult.  
 
Gio Pascual [phone]

## 2020-12-23 NOTE — ED NOTES
Patient resting in stretcher in position of comfort with family at bedside. Awaiting transport. VSS. Call bell in reach.

## 2020-12-23 NOTE — PROGRESS NOTES
Spiritual Care Assessment/Progress Note [address] 
 
 
NAME: Debbie Early. MRN: [id number] AGE: 39 y.o. SEX: male Denominational Affiliation: No preference Language: English  
 
12/23/2020     Total Time (in minutes): 10 Spiritual Assessment begun in [facility] CV SERVICES UNIT through conversation with: 
  
    []Patient        [] Family    [] Friend(s) Reason for Consult: Palliative Care, Initial/Spiritual Assessment Spiritual beliefs: (Please include comment if needed) 
   [] Identifies with a magdy tradition:     
   [] Supported by a magdy community:        
   [] Claims no spiritual orientation:       
   [] Seeking spiritual identity:            
   [] Adheres to an individual form of spirituality:       
   [x] Not able to assess:                   
 
    
Identified resources for coping:  
   [] Prayer                           
   [] Music                  [] Guided Imagery 
   [] Family/friends                 [] Pet visits [] Devotional reading                         [x] Unknown 
   [] Other:                                         
 
 
Interventions offered during this visit: (See comments for more details) Patient Interventions: Initial visit Plan of Care: 
 
 [] Support spiritual and/or cultural needs  
 [] Support AMD and/or advance care planning process    
 [] Support grieving process 
 [] Coordinate Rites and/or Rituals  
 [] Coordination with community clergy [] No spiritual needs identified at this time 
 [] Detailed Plan of Care below (See Comments)  [] Make referral to Music Therapy 
[] Make referral to Pet Therapy    
[] Make referral to Addiction services 
[] Make referral to [facility] 
[] Make referral to Spiritual Care Partner 
[] No future visits requested       
[x] Follow up visits as needed Attempted to visit pt for initial spiritual assessment. Pt was sleeping at time of visit and did not awake. Chaplain Caicedo MDiv, MS, [facility] 
[phone] [phone])

## 2020-12-23 NOTE — PROGRESS NOTES
[address] [facility] Adult  Hospitalist Group Hospitalist Progress Note Hill Sanders MD 
Answering service: [phone] OR [phone] from in house phone Date of Service:  2020 NAME:  Hira Alvarado. :  1975 MRN:  [id number] This documentation was facilitated by a Voice Recognition software and may contain inadvertent typographical errors. 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 Patient has bilateral percutaneous nephrostomy tubes. In the ER he was found to be septic due to UTI. Interval history / Subjective: Follow-up for sepsis. Leaking bilateral percutaneous nephrostomy's Patient's chronically sick looking, weak but alert oriented. He says both nephrostomy sites leaking. Here for no complaints otherwise. He denied pain, fever or chills. No nausea or vomiting. Updated his wife on the phone. Amaya Strickland phone: [phone] Assessment & Plan:  
Sepsis (with SIRS features of leukocytosis, lactic acidosis and tachycardia) most probably due to UTI with bilateral ureteral stents and percutaneous nephrostomy in situ 
-Sepsis present completed. Blood cultures in process. Urine culture was not sent. Add on ordered. 
-Continue with Zosyn. We will continue vancomycin if blood culture remains negative for 48 hours. 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 
-Patient is currently receiving care at the cancer treatment centers in [facility], he just was discharged from there last  
-CT abdomen showed new hepatic metastases, new bone metastasis, new bilateral pleural effusion. Head CT negative 
-Oncology consulted. Blurred vision worse on the left. Head CT is negative. Per his wife, had brain imaging including MRI at the cancer treatment centers of [facility] and no metastatic disease was detected. Hypomagnesemia: Replace. Normocytic anemia most probably secondary to anemia of chronic disease. This could be worked up and followed up as outpatient. Mild thrombocytopenia which could be sepsis, cancer, cancer treatment. No bleeding. Monitor Code status: Full code DVT prophylaxis: Heparin Care Plan discussed with: Patient/Family and Nurse Anticipated Disposition: Home w/Family Anticipated Discharge: Greater than 48 hours Hospital Problems  Date Reviewed: 12/23/2020 Codes Class Noted POA * (Principal) Sepsis ([facility] [address] ICD-10-CM: A41.9 ICD-9-CM: 038.9, 995.91  12/22/2020 Yes Review of Systems: A comprehensive review of systems was negative except for that written in the HPI. Vital Signs:  
 Last 24hrs VS reviewed since prior progress note. Most recent are: 
Visit Vitals /85 (BP 1 Location: Right arm, BP Patient Position: At rest) Pulse (!) 101 Temp 98.4 °F (36.9 °C) Resp 21 Ht 6' 0.01\" (1.829 m) Wt 56.5 kg (124 lb 9 oz) SpO2 96% BMI 16.89 kg/m² Intake/Output Summary (Last 24 hours) at 12/23/2020 1801 Last data filed at 12/23/2020 4193 Gross per 24 hour Intake 50 ml Output 225 ml Net -175 ml Physical Examination:  
 
I had a face to face encounter with this patient and independently examined them on12/23/2020 as outlined below: 
     
Constitutional:  Weak and chronically sick looking. Not in distress. HEENT:  Atraumatic. Oral mucosa moist,. Non icteric sclera. No pallor. Resp:  CTA bilaterally. No wheezing/rhonchi/rales. No accessory muscle use Chest Wall: No deformity CV:  Regular rhythm, normal rate, no murmurs, gallops, rubs GI:  Soft, non distended, non tender. normoactive bowel sounds, no hepatosplenomegaly :  Bilateral nephrostomy tubes in place. Dressing soaked with urine. Musculoskeletal:  No edema, warm, 2+ pulses throughout Neurologic:  Mental status:AAOx3,  
Cranial nerves II-XII : WNL Motor exam:Moves all extremities symmetrically Data Review:  
 Review and/or order of clinical lab test 
Review and/or order of tests in the radiology section of CPT Review and/or order of tests in the medicine section of [facility] Labs:  
 
Recent Labs  
  12/23/20 
0357 12/22/20 
1523 WBC 34.4* 35.0* HGB 8.0* 9.5* HCT 24.8* 29.8*  
* 128* Recent Labs  
  12/23/20 
0357 12/22/20 
1523  142  
K 3.9 3.7 * 102 CO2 22 25 BUN 30* 27* CREA 0.96 1.15  
GLU 59* 84  
CA 7.4* 8.9 MG 1.4*  --   
PHOS 4.8*  --   
 
Recent Labs  
  12/23/20 
0357 12/22/20 
1523 ALT 42 52 * 405* TBILI 0.4 0.3 TP 5.8* 6.6 ALB 2.1* 2.4*  
GLOB 3.7 4.2*  
LPSE 176  --   
 
Recent Labs  
  12/23/20 
0357 INR 1.5* PTP 15.4* Recent Labs  
  12/23/20 
4588 TIBC 163* PSAT 51* FERR 8,447* Lab Results Component Value Date/Time Folate 14.1 12/23/2020 03:57 AM  
  
No results for input(s): PH, PCO2, PO2 in the last 72 hours. No results for input(s): CPK, CKNDX, TROIQ in the last 72 hours. No lab exists for component: CPKMB Lab Results Component Value Date/Time Cholesterol, total 220 (H) 09/26/2017 04:03 PM  
 HDL Cholesterol 37 (L) 09/26/2017 04:03 PM  
 LDL, calculated 118 (H) 09/26/2017 04:03 PM  
 Triglyceride 325 (H) 09/26/2017 04:03 PM  
 
Lab Results Component Value Date/Time Glucose (POC) 81 12/23/2020 06:16 AM  
 
Lab Results Component Value Date/Time  Color RED 12/22/2020 03:23 PM  
 Appearance TURBID (A) 12/22/2020 03:23 PM  
 Specific gravity 1.015 12/22/2020 03:23 PM  
 pH (UA) 6.5 12/22/2020 03:23 PM  
 Protein 100 (A) 12/22/2020 03:23 PM  
 Glucose Negative 12/22/2020 03:23 PM  
 Ketone TRACE (A) 12/22/2020 03:23 PM  
 Urobilinogen 0.2 12/22/2020 03:23 PM  
 Nitrites Positive (A) 12/22/2020 03:23 PM  
 Leukocyte Esterase MODERATE (A) 12/22/2020 03:23 PM  
 Epithelial cells FEW 12/22/2020 03:23 PM  
 Bacteria 1+ (A) 12/22/2020 03:23 PM  
 WBC 20-50 12/22/2020 03:23 PM  
 RBC >100 (H) 12/22/2020 03:23 PM  
 
 
 
Medications Reviewed:  
 
Current Facility-Administered Medications Medication Dose Route Frequency  melatonin tablet 3 mg  3 mg Oral QHS PRN  
 tamsulosin (FLOMAX) capsule 0.4 mg  0.4 mg Oral DAILY  sodium chloride (NS) flush 5-40 mL  5-40 mL IntraVENous Q8H  
 sodium chloride (NS) flush 5-40 mL  5-40 mL IntraVENous PRN  
 acetaminophen (TYLENOL) tablet 650 mg  650 mg Oral Q6H PRN Or  
 acetaminophen (TYLENOL) suppository 650 mg  650 mg Rectal Q6H PRN  polyethylene glycol (MIRALAX) packet 17 g  17 g Oral DAILY PRN  promethazine (PHENERGAN) tablet 12.5 mg  12.5 mg Oral Q6H PRN Or  
 ondansetron (ZOFRAN) injection 4 mg  4 mg IntraVENous Q6H PRN  
 heparin (porcine) injection 5,000 Units  5,000 Units SubCUTAneous Q8H  
 vancomycin (VANCOCIN) 1,000 mg in 0.9% sodium chloride 250 mL (VIAL-MATE)  1 g IntraVENous Q12H  piperacillin-tazobactam (ZOSYN) 3.375 g in 0.9% sodium chloride (MBP/ADV) 100 mL MBP  3.375 g IntraVENous Q8H  
 lactated Ringers infusion  150 mL/hr IntraVENous CONTINUOUS  
 vancomycin dosing per pharmacy    Other Rx Dosing/Monitoring  sodium chloride (NS) flush 5-10 mL  5-10 mL IntraVENous PRN  
 
______________________________________________________________________ EXPECTED LENGTH OF STAY: - - - 
ACTUAL LENGTH OF STAY:          1 Mary Kate Boswell MD

## 2020-12-23 NOTE — PROGRESS NOTES
Zosyn dose adjustment Indication:  sepsis Current regimen:  3.375gm q6h Abx regimen: vancomycin Recent Labs  
  20 
1523 WBC 35.0*  
CREA 1.15  
BUN 27* Est CrCl: 72 ml/min Temp (24hrs), Av.8 °F (37.1 °C), Min:98.4 °F (36.9 °C), Max:99.1 °F (37.3 °C) Cultures: pending Plan: Change to 3.375gm q8h

## 2020-12-23 NOTE — PROCEDURES
Interventional Radiology  Procedure Note        12/23/2020 4:55 PM    Patient: Carmela Matthews. Informed consent obtained    Diagnosis: Hydronephrosis    Procedure(s): Bilateral nephroureteral stent exchange  - Placed a 10Fr on the right, which was leaking more  - Placed 8Fr on the left. Attempted placement of 10 on the left as well, but the renal pelvis was too small to allow pigtail formation  - Both indwelling catheters were obstructed at the ureteral junction    Specimens removed:   Indwelling NUS removed bilaterally    Complications: None    Primary Physician: Niya Cevallos MD    Recommendations: Exchange in 3-6 months, or if there is a problem with function    Discharge Disposition: stable; discharge to home    Full dictated report to follow    Niya Cevallos MD  Interventional Radiology  [facility] Radiology, [facility].  4:55 PM, 12/23/2020

## 2020-12-23 NOTE — PROGRESS NOTES
Comprehensive Nutrition Assessment Comprehensive Nutrition Assessment Type and Reason for Visit: Initial, Positive nutrition screen Nutrition Recommendations/Plan: 1. Start home Tube feeding regimen when able-  
 TwoCal (237 mL, 1 can) x 4 per day. Flush 120 mL before and after bolus. Give Banatrol TID to help with loose stools. 2. Continue diet- advance to Regular as able 3. Add Ensure Enlive Strawberry to trays. Nutrition Assessment:    
 
Pt admitted for Sepsis ([facility] [A41.9] and leaking nephrostomy tube. Pt  has a past medical history of Cancer ([facility] [address] (Feb 2020) neck swelling dx as H&N cancer. Pt with carcinoma of head and neck with mets. Treatment with Radiation and chemo. Nephrostomy tubes placed in [facility] after dx of bilateral urinary obstruction. Was to start immunotherapy, hasn't start that yet. Pt states he used to weigh \"187 lbs before the cancer. \" Pt has been using Twocal at home- 4 cans per day. Flush 120 mL before and after bolus. Pt eats by mouth as well but has poor appetite and doesn't eat much. Denies chew/swallow difficulties at this time. Has loose stools related to Tube feeding. Discussed with pt about adding Banatrol to see if it might help thicken stools. Denies n/v. Wt loss over last ~10-12 months of 63 lbs due to cancer and decreased PO and kcal intake prior to PEG placement. Unsure date of PEG placement or wt changes since PEG placed. Daily wts. Monitor lytes and stool consistency. Pt states ~20 min after Tube feeding he needs to have a BM urgently. Malnutrition Assessment: 
Malnutrition Status:  Severe malnutrition Context:  Acute illness Findings of the 6 clinical characteristics of malnutrition:  
Energy Intake:  Mild decrease in energy intake (specify) Weight Loss:  7.00 - Greater than 7.5% over 3 months Body Fat Loss:  7 - Moderate body fat loss, Triceps, Orbital  
 Muscle Mass Loss:  7 - Moderate muscle mass loss, Hand (interosseous), Clavicles (pectoralis & deltoids) Fluid Accumulation:  No significant fluid accumulation,   
 Strength:  Not performed Nutrition related medications: 
Vanc, zosyn, flomax. IVF- LR @ 150 mL/hr. Estimated Daily Nutrient Needs: 
Energy (kcal):  2316 (BMR x 1.3x1.2) Protein (g):  102 (usual BW x 1.2) Fluid (ml/day):  2300 Nutrition Related Findings:      
BM PTA, no edema. Wounds:   
None Current Nutrition Therapies: DIET CLEAR LIQUID 
DIET TUBE FEEDING Bolus - TwoCal (237 mL, 1 can) x 4 per day. Flush 120 mL water before and after bolus. This is pt home regimen. Current Tube Feeding (TF) Orders: · Feeding Route: PEG 
· Formula: TwoCal 
· Schedule: Bolus · Regimen: 237 mL x 4 per day · Additives/Modulars: (Banatrol TID) · Water Flushes: 120 mL B/A bolus · Current TF & Flush Orders Provides: 1920 kcal, 80 g Pro, 1632 mL free water · Goal TF & Flush Orders Provides: 1920 kcal, 80 g Pro, 1632 mL free water Anthropometric Measures: 
· Height:  6' 0.01\" (182.9 cm) · Current Body Wt:  56.2 kg (124 lb) · Admission Body Wt:  123 lb · Usual Body Wt:  84.8 kg (187 lb) · Ideal Body Wt:  178 lbs:  69.7 % · BMI Category:  Underweight (BMI less than 18.5) Nutrition Diagnosis:  
· Inadequate energy intake related to catabolic illness as evidenced by weight loss 7.5% in 3 months, nutrition support-enteral nutrition, BMI, weight loss Nutrition Interventions:  
Food and/or Nutrient Delivery: Continue current diet, Start tube feeding Nutrition Education and Counseling: No recommendations at this time Coordination of Nutrition Care: Continue to monitor while inpatient, Interdisciplinary rounds Goals: Pt will tolerate TF @ goal and consume PO as able and maintain wt +/- 2lbs within 5-7 days Nutrition Monitoring and Evaluation:  
Behavioral-Environmental Outcomes: None identified Food/Nutrient Intake Outcomes: Diet advancement/tolerance Physical Signs/Symptoms Outcomes: Biochemical data, Weight, GI status Discharge Planning:   
Continue oral nutrition supplement, Continue current diet, Enteral nutrition Electronically signed by Franchesca Jones RD on 12/23/2020 Contact: [phone]

## 2020-12-23 NOTE — ED NOTES
Receiving nurse unable to take report at this time, will call back. Patient and family updated on plan of care.

## 2020-12-23 NOTE — PROGRESS NOTES
Reason for Admission:  Patient has history of squamous cell carcinoma, nephrostomy tube leaking RUR Score:  11% risk of re-admission Plan for utilizing home health:  Patient denies being open to home health prior to admission, he endorses his family has been managing nephrostomy tube independently PCP: First and Last name:  Dr. Lazara Hahn Name of Practice: [facility] Are you a current patient: Yes/No: Yes Approximate date of last visit: Patient endorses within the past year Can you participate in a virtual visit with your PCP: N/A Current Advanced Directive/Advance Care Plan: Full Code, not on file- spouse is MARIVEL [facility] Transition of Care Plan:  Home with family The CM met with the patient at bedside in order to introduce the role of CM and assess for patient needs. The patient lives at home with his wife, father and mother-in-law, 25-year-old son, 25-year-old daughter (autistic), and 3year-old. The patient verified demographics and insurance information. The patient endorses being independent with ADLs and mobility, denied use of DME- he endorses his father-in-law is a retired EMT and assists with management of nephrostomy. The patient utilizes [facility] for prescriptions. The patient denied being open to any home health services prior to admission, endorses family will transport home. The CM will follow for transitions of care and monitor for home health needs. QUINN Leigh Care Management Interventions PCP Verified by CM: Yes(Patient verified PCP as Dr. Lazara Hahn) Mode of Transport at Discharge: Other (see comment)(Family to transport ) Transition of Care Consult (CM Consult): Discharge Planning Physical Therapy Consult: No 
Occupational Therapy Consult: No 
Current Support Network: Own Home, Lives with Spouse Confirm Follow Up Transport: Family Discharge Location Discharge Placement: Home

## 2020-12-23 NOTE — PROGRESS NOTES
2225: TRANSFER - IN REPORT: 
 
Verbal report received from April, RN(name) on [facility].  being received from [facility] ED(unit) for routine progression of care Report consisted of patients Situation, Background, Assessment and  
Recommendations(SBAR). Information from the following report(s) SBAR, Kardex, ED Summary, Intake/Output, MAR and Med Rec Status was reviewed with the receiving nurse. Opportunity for questions and clarification was provided. Assessment completed upon patients arrival to unit and care assumed. 2230: Patient arrived to [facility] via stretcher. Admitting physician paged. VSS and assessment complete. 0730: Bedside and Verbal shift change report given to Jose Antonio Gutierrez RN (oncoming nurse) by Mitali Villagran RN (offgoing nurse). Report included the following information SBAR, Kardex, ED Summary, Intake/Output, MAR, Recent Results and Med Rec Status.

## 2020-12-23 NOTE — PROGRESS NOTES
Pt arrives via stretcher to angio department accompanied by transporter for Right neph tube change procedure. All assessments completed and consent was reviewed. Patient reports that \"both tubes are leaking. \" Education given was regarding procedure, moderate sedation, post-procedure care and  management/follow-up. Opportunity for questions was provided and all questions and concerns were addressed. Dr Loya Ahr to replace both left and right neph tubes  1750 TRANSFER - OUT REPORT:    Verbal report given to Ancherit RN(name) on Nando Baumann.  being transferred to [facility](unit) for routine progression of care       Report consisted of patients Situation, Background, Assessment and   Recommendations(SBAR). Information from the following report(s) SBAR, Procedure Summary and MAR was reviewed with the receiving nurse. Lines:   Peripheral IV 12/22/20 Right Antecubital (Active)   Site Assessment Clean, dry, & intact 12/22/20 2230   Phlebitis Assessment 0 12/22/20 2230   Infiltration Assessment 0 12/22/20 2230   Dressing Status Clean, dry, & intact 12/22/20 2230   Dressing Type Transparent 12/22/20 2230   Hub Color/Line Status Pink;Flushed;Capped 12/22/20 2230   Action Taken Open ports on tubing capped 12/22/20 2230       Peripheral IV 12/23/20 Left Wrist (Active)   Site Assessment Clean, dry, & intact 12/23/20 0400   Phlebitis Assessment 0 12/23/20 0400   Infiltration Assessment 0 12/23/20 0400   Dressing Status Clean, dry, & intact 12/23/20 0400   Dressing Type Transparent 12/23/20 0400   Hub Color/Line Status Pink; Infusing 12/23/20 0400   Action Taken Open ports on tubing capped 12/23/20 0400   Alcohol Cap Used Yes 12/23/20 0400        Opportunity for questions and clarification was provided.     Patient transported by this RN to CT for ordered CTA

## 2020-12-24 NOTE — PROGRESS NOTES
0730: Bedside shift change report given to [address] (oncoming nurse) by Rodrick Eaton (offgoing nurse). Report included the following information SBAR, Kardex, MAR, Recent Results and Cardiac Rhythm NSR.  
 
1800: TRANSFER - IN REPORT: 
 
Verbal report received from Yasmine(name) on [facility].  being received from Angio(unit) for routine post - op Report consisted of patients Situation, Background, Assessment and  
Recommendations(SBAR). Information from the following report(s) SBAR, Kardex, MAR, Recent Results and Cardiac Rhythm NSR was reviewed with the receiving nurse. Opportunity for questions and clarification was provided. Assessment completed upon patients arrival to unit and care assumed. 1930: Bedside shift change report given to Rodrick Eaton (oncoming nurse) by [address] (offgoing nurse). Report included the following information SBAR, Kardex, MAR, Recent Results and Cardiac Rhythm NSR.

## 2020-12-24 NOTE — PROGRESS NOTES
1930: Bedside and Verbal shift change report given to Hilton Ravi RN (oncoming nurse) by Roberts Chapel, RN (offgoing nurse). Report included the following information SBAR, Kardex, ED Summary, Intake/Output, MAR, Recent Results and Med Rec Status. 0730: Bedside and Verbal shift change report given to Renae [facility] (oncoming nurse) by Hilton Ravi RN (offgoing nurse). Report included the following information SBAR, Kardex, Procedure Summary, MAR, Recent Results and Med Rec Status.

## 2020-12-24 NOTE — PROGRESS NOTES
Physician Progress Note Lori Sheth 
CSN #:                  [id number] :                       1975 ADMIT DATE:       2020 2:51 PM 
DISCH DATE: 
RESPONDING 
PROVIDER #:        ANNIE EASTMAN MD 
 
 
 
 
QUERY TEXT: 
 
Patient admitted with Sepsis, with Metastatic CA. Documentation of Cachexia, with RD note of pt having a total weight loss of >7.5% over the past 3 mos, with moderate muscle mass/ fat wasting. BMI 16. If possible, please document in progress notes and discharge summary if you are evaluating and /or treating any of the following: The medical record reflects the following: 
Risk Factors: Metastatic CA, with poor po intake Clinical Indicators: pt with significant weight loss of >7.5% over the past 3 months, poor appetite and po intake. Pt with moderate muscle mass/ fat wasting per RD documentation. Cachexia is documented in the H&P. BMI 17.88. Treatment: Tube feedings, Ensure Enlive 3x day, anti-emetics prn for nausea, RD to follow. Thank you, if you have any questions please e-mail me at [email or web address] 
Options provided: -- Protein calorie malnutrition severe -- Protein calorie malnutrition moderate 
-- Other - I will add my own diagnosis -- Disagree - Not applicable / Not valid -- Disagree - Clinically unable to determine / Unknown 
-- Refer to Clinical Documentation Reviewer PROVIDER RESPONSE TEXT: 
 
This patient has severe protein calorie malnutrition.  
 
Query created by: Jer Vargas on 2020 10:35 AM 
 
 
Electronically signed by:  ANNIE ÁLVAREZ [facility] MD 2020 11:27 AM

## 2020-12-24 NOTE — PROGRESS NOTES
0730:Bedside shift change report given to [address] [facility] (oncoming nurse) by [address] RN (offgoing nurse). Report included the following information SBAR, Kardex, Intake/Output, MAR, Recent Results and Cardiac Rhythm SInus tach. 1930: Bedside shift change report given to [address] [place] [address]  (oncoming nurse) by [facility] RN (offgoing nurse). Report included the following information SBAR, Kardex, Intake/Output, MAR, Recent Results and Cardiac Rhythm NSR./ sinus tach

## 2020-12-24 NOTE — CONSULTS
[address] Name:  Felipe Stein 
MR#:  [id number] :  1975 ACCOUNT #:  [de-identified] DATE OF SERVICE:  2020 HEMATOLOGY-ONCOLOGY CONSULTATION NOTE 
 
REASON FOR ADMISSION:  Leaking nephrostomy tube. 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 HISTORY OF PRESENT ILLNESS:  The patient is a 51-year-old man, who was diagnosed earlier this year with localize head and neck cancer. He was originally seen by Dr Rommel Vargas in the [facility] and recommendation was made for weekly concurrent chemoradiation therapy with cisplatin. The patient opted to receive this treatment at [address] [place] [address] and completed the course of concurrent cisplatin chemotherapy there. Unfortunately, he was found to have a recurrence involving his liver and bone, and was offered PD-L1 treatment with [facility], but opted to receive treatment at the [facility]. When he went there for an opinion, they explained that his kidney function was abnormal and ultimately he required nephrostomy tube placement. He did not receive PD-L1 treatment there. He now presents to this hospital with leaking nephrostomy tubes. He was seen by the Interventional Radiology Service and had bilateral nephrostomy tubes exchanged. His kidney function is essentially normal.  His performance status is 2 since that he is able to walk to the bathroom unaided. He denied any pain. He had imaging studies here including CT scan of the chest, abdomen, and pelvis that unfortunately revealed bony metastasis, bilateral pleural effusions, liver metastasis, and marked contrast in the renal collecting system. The patient is interested in receiving treatments. PAST MEDICAL HISTORY:  As above. ALLERGIES:  NO KNOWN DRUG ALLERGIES. CURRENT MEDICATIONS IN THE HOSPITAL: 
1.  Zosyn 3.375 g IV q.8. 
2.  Flomax 0.4 mg daily. 3.  Vancomycin 1 g q.12. SOCIAL HISTORY:  He is . He has a 51-year-old son, a 29-year-old autistic daughter, and a 3year-old daughter from his most recent marriage. He lives with his wife, his children, and his in-laws. FAMILY HISTORY:  Mother had diabetes and breast cancer. REVIEW OF SYSTEMS: 
GENERAL:  No fevers or chills. HEENT:  No auditory or visual change. LYMPHATIC:  No lumps or bumps in neck, underarm, or groin. CARDIOVASCULAR:  No chest pain or palpitations. PULMONARY:  No cough or shortness of breath. GASTROINTESTINAL:  No abdominal pain, diarrhea, or constipation. GENITOURINARY:  As above. MUSCULOSKELETAL:  No red or swollen joints. NEUROLOGIC:  No irritability or syncope. PHYSICAL EXAMINATION: 
GENERAL:  Awake, alert, pleasant, in no acute distress. VITAL SIGNS:  Blood pressure 119/74, temperature 98.7, saturating 98% on room air. HEENT:  Sclerae anicteric. Oropharynx clear. NECK:  Supple without lymphadenopathy or thyromegaly. HEART:  Regular rate and rhythm without murmur, rub, or gallop. LUNGS:  Clear to auscultation bilaterally. ABDOMEN:  PEG tube in place without surrounding erythema or fluctuance. Bilateral nephrostomy tubes. EXTREMITIES:  No clubbing, cyanosis, or edema. PSYCHIATRIC:  Normal mood and affect. Alert and oriented x3. 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 1.  Metastatic recurrence of head-neck cancer: This young patient deserves a chance to be exposed to PD-L1 treatment. This is FDA approved for recurrent disease and that will help the survival ascent too, meaning that a significant percent of patients have prolonged survival.  See graph below that I placed in the chart. I have set him up for treatment in my office next week and told him that he is free to receive his treatment wherever he would like, but we would like to help him as soon as possible given his extensive disease and the potential efficacy of this treatment. We will keep him discharged from my standpoint for pain control. Neri Triplett MD 
 
 
[id number] 
D:  12/24/2020 8:32 
T:  12/24/2020 12:35 JOB #:  [id number]

## 2020-12-24 NOTE — PROGRESS NOTES
[facility] 
                                                                            
           Hospitalist Progress Note 
Chester Marcelino MD 
Answering service: [phone] OR [phone] from in house phone 
  
  
Date of Service:  2020 
NAME:  Naresh Bello Jr. 
:  1975 
MRN:  [id number] 
 
Admission Summary:  
This is a 45-year-old gentleman with history significant for metastatic squamous cell carcinoma of the head and neck presented to the ER for leaking nephrostomy tube.  Patient has bilateral percutaneous nephrostomy tubes.  In the ER he was found to be septic due to UTI. 
 
Interval history / Subjective:  
Follow-up for sepsis.  Leaking bilateral percutaneous nephrostomy's.  
Pt seen and examined. Feels ok, no fever/chills. No acute events overnight, comfortable, though remains in sinus tachycardia. Looks chronically sick and older than stated age.  
 
Assessment & Plan:  
#. B/l Nephrostomy tubes in situ: leaking on admit. IR replaced. 
#. UTI: UA suggestive, Urine culture pending. Empiric iv abx. 
#. Severe Sepsis: 2nd to above. Elevated wcc/HR. With lactic acidosis. BCs NGTD 
- Zosyn.  We will dc vancomycin if blood culture remains negative for 48 hours. 
 
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 
-  just was discharged from cancer center in [place] last  
- CT cap: new hepatic metastases, new bone metastasis, new b/l pleural effusion.  
- Oncology following. 
 
Blurred vision worse on the left. CT head: NAD. Per wife recent brain MRI: No mets 
Hypomagnesemia: Replace. 
Normocytic anemia and thrombocytopenia: likely cancer related. Tx Per Oncology 
 
Code status: Full code. Wife Temi Snyder [phone] 
DVT prophylaxis: Heparin 
Care Plan discussed with: Patient/Family and Nurse 
Anticipated Disposition: Home w/Family Greater than 48 hours 
Hospital Problems  Date Reviewed: 2020  
       Codes Class Noted POA  
 * (Principal) Sepsis ([facility] [address] ICD-10-CM: A41.9 ICD-9-CM: 038.9, 995.91  12/22/2020 Yes Review of Systems: A comprehensive review of systems was negative except for that written in the HPI. Vital Signs:  
 Last 24hrs VS reviewed since prior progress note. Most recent are: 
Visit Vitals /74 (BP 1 Location: Right arm, BP Patient Position: At rest) Pulse (!) 111 Temp 98.7 °F (37.1 °C) Resp 18 Ht 6' 0.01\" (1.829 m) Wt 59.8 kg (131 lb 13.4 oz) SpO2 98% BMI 17.88 kg/m² Intake/Output Summary (Last 24 hours) at 12/24/2020 7694 Last data filed at 12/24/2020 0968 Gross per 24 hour Intake 350 ml Output 1300 ml Net -950 ml Physical Examination:  
 
I had a face to face encounter with this patient and independently examined them on12/24/2020 as outlined below: 
     
Constitutional:  Weak and chronically sick looking. Not in distress. Resp:  No accessory muscle use Chest Wall: No deformity CV:  Regular rhythm, normal rate, tachycardic GI:  Soft, non distended, non tender. :  Bilateral nephrostomy tubes in place. Bloody urine in bag Musculoskeletal:  No edema, warm Neurologic:  Mental status:AAOx3, Motor exam:Moves all extremities symmetrically Data Review:  
 Review and/or order of clinical lab test 
Review and/or order of tests in the radiology section of CPT Review and/or order of tests in the medicine section of CPT Labs:  
 
Recent Labs  
  12/24/20 
0305 12/23/20 0357 WBC 30.2* 34.4* HGB 7.1* 8.0*  
HCT 22.5* 24.8*  
PLT 98* 108* Recent Labs  
  12/24/20 
0305 12/23/20 
0357 12/22/20 
1523  140 142  
K 3.8 3.9 3.7 * 109* 102 CO2 21 22 25 BUN 33* 30* 27* CREA 1.20 0.96 1.15  
GLU 85 59* 84  
CA 7.1* 7.4* 8.9 MG  --  1.4*  --   
PHOS  --  4.8*  --   
 
Recent Labs  
  12/24/20 
0305 12/23/20 
0357 12/22/20 
1523 ALT 37 42 52 * 352* 405* TBILI 0.4 0.4 0.3 TP 5.5* 5.8* 6.6 ALB 1.9* 2.1* 2.4*  
GLOB 3.6 3.7 4.2*  
LPSE  --  176  --   
 
Recent Labs  
  12/23/20 
0357 INR 1.5* PTP 15.4* Recent Labs  
  12/23/20 
3114 TIBC 163* PSAT 51* FERR 8,447* Lab Results Component Value Date/Time Folate 14.1 12/23/2020 03:57 AM  
  
No results for input(s): PH, PCO2, PO2 in the last 72 hours. No results for input(s): CPK, CKNDX, TROIQ in the last 72 hours. No lab exists for component: CPKMB Lab Results Component Value Date/Time Cholesterol, total 220 (H) 09/26/2017 04:03 PM  
 HDL Cholesterol 37 (L) 09/26/2017 04:03 PM  
 LDL, calculated 118 (H) 09/26/2017 04:03 PM  
 Triglyceride 325 (H) 09/26/2017 04:03 PM  
 
Lab Results Component Value Date/Time Glucose (POC) 81 12/23/2020 06:16 AM  
 
Lab Results Component Value Date/Time Color RED 12/22/2020 03:23 PM  
 Appearance TURBID (A) 12/22/2020 03:23 PM  
 Specific gravity 1.015 12/22/2020 03:23 PM  
 pH (UA) 6.5 12/22/2020 03:23 PM  
 Protein 100 (A) 12/22/2020 03:23 PM  
 Glucose Negative 12/22/2020 03:23 PM  
 Ketone TRACE (A) 12/22/2020 03:23 PM  
 Urobilinogen 0.2 12/22/2020 03:23 PM  
 Nitrites Positive (A) 12/22/2020 03:23 PM  
 Leukocyte Esterase MODERATE (A) 12/22/2020 03:23 PM  
 Epithelial cells FEW 12/22/2020 03:23 PM  
 Bacteria 1+ (A) 12/22/2020 03:23 PM  
 WBC 20-50 12/22/2020 03:23 PM  
 RBC >100 (H) 12/22/2020 03:23 PM  
 
 
 
Medications Reviewed:  
 
Current Facility-Administered Medications Medication Dose Route Frequency  melatonin tablet 3 mg  3 mg Oral QHS PRN  
 tamsulosin (FLOMAX) capsule 0.4 mg  0.4 mg Oral DAILY  sodium chloride (NS) flush 5-40 mL  5-40 mL IntraVENous Q8H  
 sodium chloride (NS) flush 5-40 mL  5-40 mL IntraVENous PRN  
 acetaminophen (TYLENOL) tablet 650 mg  650 mg Oral Q6H PRN Or  
 acetaminophen (TYLENOL) suppository 650 mg  650 mg Rectal Q6H PRN  polyethylene glycol (MIRALAX) packet 17 g  17 g Oral DAILY PRN  
  promethazine (PHENERGAN) tablet 12.5 mg  12.5 mg Oral Q6H PRN Or  
 ondansetron (ZOFRAN) injection 4 mg  4 mg IntraVENous Q6H PRN  
 heparin (porcine) injection 5,000 Units  5,000 Units SubCUTAneous Q8H  
 vancomycin (VANCOCIN) 1,000 mg in 0.9% sodium chloride 250 mL (VIAL-MATE)  1 g IntraVENous Q12H  piperacillin-tazobactam (ZOSYN) 3.375 g in 0.9% sodium chloride (MBP/ADV) 100 mL MBP  3.375 g IntraVENous Q8H  
 lactated Ringers infusion  150 mL/hr IntraVENous CONTINUOUS  
 vancomycin dosing per pharmacy    Other Rx Dosing/Monitoring  sodium chloride (NS) flush 5-10 mL  5-10 mL IntraVENous PRN  
 
______________________________________________________________________ EXPECTED LENGTH OF STAY: - - - 
ACTUAL LENGTH OF STAY:          2 Cecile Frost MD

## 2020-12-25 NOTE — PROGRESS NOTES
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 Case discussed with following: . Patient complains of the following: weak, nut no other complaints Additional concerns noted by the staff:  
 
Patient Vitals for the past 24 hrs: 
 BP Temp Pulse Resp SpO2 Weight 12/25/20 0743 123/85 98.4 °F (36.9 °C) (!) 104 16 99 %   
12/25/20 0606      64.4 kg (141 lb 15.6 oz) 12/25/20 0358 127/81 98.7 °F (37.1 °C) (!) 102 18 97 %   
12/24/20 2339 114/82 98.8 °F (37.1 °C) (!) 103 18 97 %   
12/24/20 1947 120/79 99.1 °F (37.3 °C) (!) 110 18 100 %   
12/24/20 1537 121/81 98.4 °F (36.9 °C) (!) 107 17 99 %   
12/24/20 1110 111/73 98.1 °F (36.7 °C) (!) 103 18 97 %  Review of Systems: 
12 point ROS done and negative except as above Physical Examination: 
Gen NAD Resp no distress Psych normal 
 
Labs: 
Recent Results (from the past 24 hour(s)) LACTIC ACID Collection Time: 12/24/20  2:38 PM  
Result Value Ref Range Lactic acid 4.3 (HH) 0.4 - 2.0 MMOL/L  
LACTIC ACID Collection Time: 12/24/20 11:44 PM  
Result Value Ref Range Lactic acid 3.7 (HH) 0.4 - 2.0 MMOL/L  
CBC WITH AUTOMATED DIFF Collection Time: 12/25/20  4:03 AM  
Result Value Ref Range WBC 25.5 (H) 4.1 - 11.1 K/uL  
 RBC 2.27 (L) 4.10 - 5.70 M/uL HGB 6.5 (L) 12.1 - 17.0 g/dL HCT 20.2 (L) 36.6 - 50.3 % MCV 89.0 80.0 - 99.0 FL  
 MCH 28.6 26.0 - 34.0 PG  
 MCHC 32.2 30.0 - 36.5 g/dL  
 RDW 19.0 (H) 11.5 - 14.5 % PLATELET 83 (L) 312 - 400 K/uL MPV 9.9 8.9 - 12.9 FL  
 NRBC 0.9 (H) 0  WBC ABSOLUTE NRBC 0.23 (H) 0.00 - 0.01 K/uL NEUTROPHILS 77 (H) 32 - 75 % BAND NEUTROPHILS 14 (H) 0 - 6 % LYMPHOCYTES 3 (L) 12 - 49 % MONOCYTES 3 (L) 5 - 13 % EOSINOPHILS 0 0 - 7 % BASOPHILS 0 0 - 1 % METAMYELOCYTES 1 (H) 0 % MYELOCYTES 2 (H) 0 % IMMATURE GRANULOCYTES 0 % ABS. NEUTROPHILS 23.2 (H) 1.8 - 8.0 K/UL  
 ABS. LYMPHOCYTES 0.8 0.8 - 3.5 K/UL  
 ABS. MONOCYTES 0.8 0.0 - 1.0 K/UL  
 ABS. EOSINOPHILS 0.0 0.0 - 0.4 K/UL  
 ABS. BASOPHILS 0.0 0.0 - 0.1 K/UL  
 ABS. IMM. GRANS. 0.0 K/UL  
 DF MANUAL    
 RBC COMMENTS ANISOCYTOSIS 1+ 
    
 RBC COMMENTS MICROCYTOSIS 1+ 
    
 RBC COMMENTS POLYCHROMASIA 1+ 
    
 RBC COMMENTS OVALOCYTES 1+ METABOLIC PANEL, COMPREHENSIVE Collection Time: 12/25/20  4:03 AM  
Result Value Ref Range Sodium 142 136 - 145 mmol/L Potassium 3.2 (L) 3.5 - 5.1 mmol/L Chloride 111 (H) 97 - 108 mmol/L  
 CO2 22 21 - 32 mmol/L Anion gap 9 5 - 15 mmol/L Glucose 72 65 - 100 mg/dL BUN 25 (H) 6 - 20 MG/DL Creatinine 0.89 0.70 - 1.30 MG/DL  
 BUN/Creatinine ratio 28 (H) 12 - 20 GFR est AA >60 >60 ml/min/1.73m2 GFR est non-AA >60 >60 ml/min/1.73m2 Calcium 6.5 (L) 8.5 - 10.1 MG/DL Bilirubin, total 0.4 0.2 - 1.0 MG/DL  
 ALT (SGPT) 34 12 - 78 U/L  
 AST (SGOT) 61 (H) 15 - 37 U/L Alk. phosphatase 321 (H) 45 - 117 U/L Protein, total 5.2 (L) 6.4 - 8.2 g/dL Albumin 1.7 (L) 3.5 - 5.0 g/dL Globulin 3.5 2.0 - 4.0 g/dL A-G Ratio 0.5 (L) 1.1 - 2.2 LACTIC ACID Collection Time: 12/25/20  6:14 AM  
Result Value Ref Range Lactic acid 2.6 (HH) 0.4 - 2.0 MMOL/L  
TYPE & SCREEN Collection Time: 12/25/20  6:14 AM  
Result Value Ref Range Crossmatch Expiration 12/28/2020,2359 ABO/Rh(D) O POSITIVE Antibody screen NEG Unit number [id number] Blood component type RC LR Unit division 00 Status of unit ALLOCATED Crossmatch result Compatible RBC, ALLOCATE Collection Time: 12/25/20  7:30 AM  
Result Value Ref Range HISTORY CHECKED? Historical check performed Assessment and Plan: 
Recurrent Head and neck ca 
-Dr. Wade Easton as offered him treatment with Opdivo after discharge Anemia 
-Patient to get 1U PRBCs for HBG <7, 6.5 today 
-Cont to monitor

## 2020-12-25 NOTE — PROGRESS NOTES
1406  Blood transfusion Visit Vitals /87 (BP 1 Location: Right arm, BP Patient Position: Head of bed elevated (Comment degrees)) Pulse (!) 101 Temp 98.6 °F (37 °C) Resp 16 Ht 6' 0.01\" (1.829 m) Wt 64.4 kg (141 lb 15.6 oz) SpO2 97% BMI 19.25 kg/m²

## 2020-12-25 NOTE — PROGRESS NOTES
1930: [facility]side [facility] by Renae RN (offgoing nurse). Report included the following information SBAR, Kardex, Intake/Output, MAR and Cardiac Rhythm NSR.  
 
2330: [employer] Yokasta Peace NP to ask about giving heparin while Hgb is decreasing. Received orders to hold heparin 0038: Notified Yokasta Peace NP of critical lactic acid 3.7 
 
0538: Notified Yokasta Peace NP of Hgb of 6.5 
 
0644: received orders to hold heparin 0730: Bedside shift change report given to [address] [place] [address] Avenue (oncoming nurse) by Yamilet Dorman RN (offgoing nurse). Report included the following information SBAR, Kardex, Intake/Output, MAR and Cardiac Rhythm NSR.

## 2020-12-25 NOTE — PROGRESS NOTES
[address] [facility]  [facility]ospitalist [facility] Hospitalist Progress Note Modesto Mcdaniels MD 
Answering service: [phone] OR [phone] from in house phone Date of Service:  2020 NAME:  Clifton Bocanegra. :  1975 MRN:  [id number] Admission Summary: This is a 20-year-old gentleman with history significant for metastatic squamous cell carcinoma of the head and neck presented to the ER for leaking nephrostomy tube. Patient has bilateral percutaneous nephrostomy tubes. In the ER he was found to be septic due to UTI. Interval history / Subjective: Follow-up for sepsis. Leaking bilateral percutaneous nephrostomy's. Pt seen and examined. Feels weak, lactic is coming down. HB dropped to 6.3, will need a unit of RBC. Consent signed. Assessment & Plan:  
#. B/l Nephrostomy tubes in situ: leaking on admit. IR replaced. #. UTI: UA suggestive, Urine culture pending. Empiric iv abx. #. Severe Sepsis: 2nd to above. Elevated wcc/HR. With lactic acidosis. BCs NGTD 
- Zosyn. 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 
-  just was discharged from cancer center in  last  
- CT cap: new hepatic metastases, new bone metastasis, new b/l pleural effusion.  
- Oncology following. Plans for op chemo. Blurred vision worse on the left. CT head: NAD. Per wife recent brain MRI: No mets Hypomagnesemia: Replace. Normocytic anemia and thrombocytopenia: likely cancer related. Tx Per Oncology 
- HB dropped to 6.3, will transfuse 1unit RBC. Monitor HB, transfuse if <7. Code status: Full code. Wife Gali Zelaya [phone] DVT prophylaxis: Heparin Care Plan discussed with: Patient/Family and Nurse Anticipated Disposition: Home w/Family Greater than 48 hours Hospital Problems  Date Reviewed: 2020 Codes Class Noted POA * (Principal) Sepsis ([facility] [address] ICD-10-CM: A41.9 ICD-9-CM: 038.9, 995.91  12/22/2020 Yes Review of Systems: A comprehensive review of systems was negative except for that written in the HPI. Vital Signs:  
 Last 24hrs VS reviewed since prior progress note. Most recent are: 
Visit Vitals /85 (BP 1 Location: Right arm, BP Patient Position: At rest) Pulse (!) 104 Temp 98.4 °F (36.9 °C) Resp 16 Ht 6' 0.01\" (1.829 m) Wt 64.4 kg (141 lb 15.6 oz) SpO2 99% BMI 19.25 kg/m² Intake/Output Summary (Last 24 hours) at 12/25/2020 3517 Last data filed at 12/25/2020 0400 Gross per 24 hour Intake 4505 ml Output 1650 ml Net 2855 ml Physical Examination:  
 
I had a face to face encounter with this patient and independently examined them on12/25/2020 as outlined below: 
     
Constitutional:  Weak and chronically sick looking. Not in distress. Resp:  No accessory muscle use Chest Wall: No deformity CV:  Regular rhythm, normal rate, tachycardic GI:  Soft, non distended, non tender. :  Bilateral nephrostomy tubes in place. Bloody urine in bag Musculoskeletal:  No edema, warm Neurologic:  Mental status:AAOx3, Motor exam:Moves all extremities symmetrically Data Review:  
 Review and/or order of clinical lab test 
Review and/or order of tests in the radiology section of CPT Review and/or order of tests in the medicine section of CPT Labs:  
 
Recent Labs  
  12/25/20 
0403 12/24/20 
0305 WBC 25.5* 30.2* HGB 6.5* 7.1*  
HCT 20.2* 22.5* PLT 83* 98* Recent Labs  
  12/25/20 
0403 12/24/20 
0305 12/23/20 
0357  142 140  
K 3.2* 3.8 3.9 * 110* 109* CO2 22 21 22 BUN 25* 33* 30* CREA 0.89 1.20 0.96  
GLU 72 85 59* CA 6.5* 7.1* 7.4* MG  --   --  1.4* PHOS  --   --  4.8* Recent Labs  
  12/25/20 
0403 12/24/20 
0305 12/23/20 
0357 ALT 34 37 42 * 316* 352* TBILI 0.4 0.4 0.4 TP 5.2* 5.5* 5.8*  
 ALB 1.7* 1.9* 2.1*  
GLOB 3.5 3.6 3.7 LPSE  --   --  176 Recent Labs  
  12/23/20 
7755 INR 1.5* PTP 15.4* Recent Labs  
  12/23/20 
4368 TIBC 163* PSAT 51* FERR 8,447* Lab Results Component Value Date/Time Folate 14.1 12/23/2020 03:57 AM  
  
No results for input(s): PH, PCO2, PO2 in the last 72 hours. No results for input(s): CPK, CKNDX, TROIQ in the last 72 hours. No lab exists for component: CPKMB Lab Results Component Value Date/Time Cholesterol, total 220 (H) 09/26/2017 04:03 PM  
 HDL Cholesterol 37 (L) 09/26/2017 04:03 PM  
 LDL, calculated 118 (H) 09/26/2017 04:03 PM  
 Triglyceride 325 (H) 09/26/2017 04:03 PM  
 
Lab Results Component Value Date/Time Glucose (POC) 81 12/23/2020 06:16 AM  
 
Lab Results Component Value Date/Time Color RED 12/22/2020 03:23 PM  
 Appearance TURBID (A) 12/22/2020 03:23 PM  
 Specific gravity 1.015 12/22/2020 03:23 PM  
 pH (UA) 6.5 12/22/2020 03:23 PM  
 Protein 100 (A) 12/22/2020 03:23 PM  
 Glucose Negative 12/22/2020 03:23 PM  
 Ketone TRACE (A) 12/22/2020 03:23 PM  
 Urobilinogen 0.2 12/22/2020 03:23 PM  
 Nitrites Positive (A) 12/22/2020 03:23 PM  
 Leukocyte Esterase MODERATE (A) 12/22/2020 03:23 PM  
 Epithelial cells FEW 12/22/2020 03:23 PM  
 Bacteria 1+ (A) 12/22/2020 03:23 PM  
 WBC 20-50 12/22/2020 03:23 PM  
 RBC >100 (H) 12/22/2020 03:23 PM  
 
 
 
Medications Reviewed:  
 
Current Facility-Administered Medications Medication Dose Route Frequency  0.9% sodium chloride infusion 250 mL  250 mL IntraVENous PRN  
 melatonin tablet 3 mg  3 mg Oral QHS PRN  
 tamsulosin (FLOMAX) capsule 0.4 mg  0.4 mg Oral DAILY  sodium chloride (NS) flush 5-40 mL  5-40 mL IntraVENous Q8H  
 sodium chloride (NS) flush 5-40 mL  5-40 mL IntraVENous PRN  
 acetaminophen (TYLENOL) tablet 650 mg  650 mg Oral Q6H PRN  Or  
 acetaminophen (TYLENOL) suppository 650 mg  650 mg Rectal Q6H PRN  
  polyethylene glycol (MIRALAX) packet 17 g  17 g Oral DAILY PRN  promethazine (PHENERGAN) tablet 12.5 mg  12.5 mg Oral Q6H PRN Or  
 ondansetron (ZOFRAN) injection 4 mg  4 mg IntraVENous Q6H PRN  
 heparin (porcine) injection 5,000 Units  5,000 Units SubCUTAneous Q8H  piperacillin-tazobactam (ZOSYN) 3.375 g in 0.9% sodium chloride (MBP/ADV) 100 mL MBP  3.375 g IntraVENous Q8H  
 lactated Ringers infusion  150 mL/hr IntraVENous CONTINUOUS  
 sodium chloride (NS) flush 5-10 mL  5-10 mL IntraVENous PRN  
 
______________________________________________________________________ EXPECTED LENGTH OF STAY: 4d 19h ACTUAL LENGTH OF STAY:          3 Won Evans MD

## 2020-12-26 NOTE — PROGRESS NOTES
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 Case discussed with following: . Patient complains of the following: Patient sleeping Additional concerns noted by the staff:  
 
Patient Vitals for the past 24 hrs: 
 BP Temp Pulse Resp SpO2  
12/26/20 0321 138/86 98.8 °F (37.1 °C) 98 18 95 % 12/25/20 2332 128/88 98.8 °F (37.1 °C) 96 18 97 % 12/25/20 1938 120/76 98.5 °F (36.9 °C) 97 18 97 % 12/25/20 1530 123/83  (!) 105  94 % 12/25/20 1515 126/89  (!) 106  97 % 12/25/20 1511 121/84 98.5 °F (36.9 °C) (!) 102 18 99 % 12/25/20 1500 121/84  (!) 106  97 % 12/25/20 1445 125/79  (!) 101  96 % 12/25/20 1430 124/80  98  96 % 12/25/20 1359 118/87 98.6 °F (37 °C) (!) 101 16 97 % 12/25/20 1149 (!) 128/92 97.7 °F (36.5 °C) (!) 107 14 98 % Physical Examination: 
Gen NAD Resp no distress Psych normal 
 
Labs: 
Recent Results (from the past 24 hour(s)) LACTIC ACID Collection Time: 12/25/20  1:42 PM  
Result Value Ref Range Lactic acid 4.5 (HH) 0.4 - 2.0 MMOL/L  
LACTIC ACID Collection Time: 12/25/20  8:45 PM  
Result Value Ref Range Lactic acid 2.6 (HH) 0.4 - 2.0 MMOL/L  
CBC WITH AUTOMATED DIFF Collection Time: 12/26/20  3:18 AM  
Result Value Ref Range WBC 28.1 (H) 4.1 - 11.1 K/uL  
 RBC 2.72 (L) 4.10 - 5.70 M/uL HGB 7.7 (L) 12.1 - 17.0 g/dL HCT 24.0 (L) 36.6 - 50.3 % MCV 88.2 80.0 - 99.0 FL  
 MCH 28.3 26.0 - 34.0 PG  
 MCHC 32.1 30.0 - 36.5 g/dL  
 RDW 18.4 (H) 11.5 - 14.5 % PLATELET 77 (L) 980 - 400 K/uL MPV 10.3 8.9 - 12.9 FL  
 NRBC 0.9 (H) 0  WBC ABSOLUTE NRBC 0.24 (H) 0.00 - 0.01 K/uL NEUTROPHILS 80 (H) 32 - 75 % BAND NEUTROPHILS 10 (H) 0 - 6 % LYMPHOCYTES 2 (L) 12 - 49 % MONOCYTES 3 (L) 5 - 13 % EOSINOPHILS 0 0 - 7 % BASOPHILS 0 0 - 1 % METAMYELOCYTES 2 (H) 0 % MYELOCYTES 3 (H) 0 % IMMATURE GRANULOCYTES 0 % ABS. NEUTROPHILS 25.3 (H) 1.8 - 8.0 K/UL  
 ABS. LYMPHOCYTES 0.6 (L) 0.8 - 3.5 K/UL  
 ABS. MONOCYTES 0.8 0.0 - 1.0 K/UL  
 ABS. EOSINOPHILS 0.0 0.0 - 0.4 K/UL  
 ABS. BASOPHILS 0.0 0.0 - 0.1 K/UL  
 ABS. IMM. GRANS. 0.0 K/UL  
 DF MANUAL    
 RBC COMMENTS ANISOCYTOSIS 1+ 
    
 RBC COMMENTS OVALOCYTES 1+ RBC COMMENTS POLYCHROMASIA 1+ METABOLIC PANEL, COMPREHENSIVE Collection Time: 12/26/20  3:18 AM  
Result Value Ref Range Sodium 142 136 - 145 mmol/L Potassium 3.3 (L) 3.5 - 5.1 mmol/L Chloride 111 (H) 97 - 108 mmol/L  
 CO2 23 21 - 32 mmol/L Anion gap 8 5 - 15 mmol/L Glucose 67 65 - 100 mg/dL BUN 23 (H) 6 - 20 MG/DL Creatinine 1.03 0.70 - 1.30 MG/DL  
 BUN/Creatinine ratio 22 (H) 12 - 20 GFR est AA >60 >60 ml/min/1.73m2 GFR est non-AA >60 >60 ml/min/1.73m2 Calcium 6.8 (L) 8.5 - 10.1 MG/DL Bilirubin, total 0.9 0.2 - 1.0 MG/DL  
 ALT (SGPT) 30 12 - 78 U/L  
 AST (SGOT) 62 (H) 15 - 37 U/L Alk. phosphatase 318 (H) 45 - 117 U/L Protein, total 5.3 (L) 6.4 - 8.2 g/dL Albumin 1.8 (L) 3.5 - 5.0 g/dL Globulin 3.5 2.0 - 4.0 g/dL A-G Ratio 0.5 (L) 1.1 - 2.2 COAGULATION SCREEN Collection Time: 12/26/20  3:18 AM  
Result Value Ref Range Fibrinogen 175 (L) 200 - 475 mg/dL INR 1.4 (H) 0.9 - 1.1 Prothrombin time 14.9 (H) 9.0 - 11.1 sec  
 aPTT 37.6 (H) 22.1 - 31.0 sec  
 aPTT, therapeutic range     58.0 - 77.0 SECS  
MAGNESIUM Collection Time: 12/26/20  3:18 AM  
Result Value Ref Range Magnesium 1.3 (L) 1.6 - 2.4 mg/dL LACTIC ACID Collection Time: 12/26/20  3:27 AM  
Result Value Ref Range Lactic acid 2.2 (HH) 0.4 - 2.0 MMOL/L Assessment and Plan: 
Recurrent Head and neck ca 
-Dr. Chandu Saavedra as offered him treatment with Opdivo after discharge Anemia 
-Patient got 1U PRBCs yesterday, HBG better, transfuse if less than 7 
-Cont to monitor Thrombocytopenia 
-may be due to tx 
-check B12/folate, check for DIC, copper, zinc

## 2020-12-26 NOTE — PROGRESS NOTES
Interventional Radiology Progress Note Evaluated the patient due to report of the left NUS causing severe pain. CT was obtained to check tube positioning. Shows the internal loops in good position in the kidney and bladder, but new asymmetric left sided body wall edema is causing significant tension on the tube between the collecting system and the anchoring skin suture. I cut the skin suture and there was immediate relief of pain. I advanced the tube into the skin a few cm in order to further relieve tension and placed a new dressing. Also discussed with patient and RN that we should flush the tubes as a precaution in order to prevent clogging. Right tube is producing blood clots, while the left is blood-tinged. The right tube exchange was not very traumatic, I expect this will resolve over time. If there is still significant bleeding from the right tube next week, we can re-evaluate. May be related to tumoral bleeding, vs simple post-procedural irritation. Hector Maguire MD 
Interventional Radiology [facility] 
2:51 PM, 12/26/2020

## 2020-12-27 NOTE — PROGRESS NOTES
[address] [facility] Adult  Hospitalist Group Hospitalist Progress Note Susan León MD 
Answering service: [phone] OR [phone] from in house phone Date of Service:  2020 NAME:  Gene Kelly. 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 Patient has bilateral percutaneous nephrostomy tubes. In the ER he was found to be septic due to UTI. Interval history / Subjective: Follow-up for sepsis. Leaking bilateral percutaneous nephrostomy's. Pt seen and examined. Feels very very weak. His pain has much improved since IR removed skin stitch on L nephrostomy site. Assessment & Plan:  
#. S/p B/l Nephrostomy tubes replacement: was leaking on admit. #. UTI: UA suggestive, Urine culture pending. Empiric iv abx. #. Severe Sepsis: 2nd to above. Elevated wcc/HR. BCs NGTD #. Lactic acidosis: not resolving, hemodynamically stable, likely D- lactic. Stop checking - Zosyn. Dc vancomycin Widely metastatic SCC of the head and neck - Just was discharged from cancer center in [place] last  
- CT cap: new hepatic metastases, new bone metastasis, new b/l pleural effusion.  
- Oncology following. Plans for op chemo. Blurred vision worse on the left. CT head: NAD. Per wife recent brain MRI: No mets Hypomagnesemia: Replace. monitor Normocytic anemia and thrombocytopenia: likely cancer related. Tx Per Oncology 
- HB dropped to 6.3, s/p 1unit RBC. Monitor HB, transfuse if <7. Code status: Full code. Wife Luciano Alexis [id number] DVT prophylaxis: Heparin Care Plan discussed with: Patient/Family and Nurse Anticipated Disposition: Home w/Family Greater than 48 hours Hospital Problems  Date Reviewed: 2020 Codes Class Noted POA * (Principal) Sepsis ([facility] [address] ICD-10-CM: A41.9 ICD-9-CM: 038.9, 995.91  12/22/2020 Yes Review of Systems: A comprehensive review of systems was negative except for that written in the HPI. Vital Signs:  
 Last 24hrs VS reviewed since prior progress note. Most recent are: 
Visit Vitals /87 (BP 1 Location: Right arm, BP Patient Position: At rest) Pulse 97 Temp 99.1 °F (37.3 °C) Resp 16 Ht 6' 0.01\" (1.829 m) Wt 62.1 kg (137 lb) SpO2 94% BMI 18.58 kg/m² Intake/Output Summary (Last 24 hours) at 12/27/2020 1161 Last data filed at 12/27/2020 6110 Gross per 24 hour Intake  Output 2175 ml Net -2175 ml Physical Examination:  
 
I had a face to face encounter with this patient and independently examined them on12/27/2020 as outlined below: 
     
Constitutional:  Weak and chronically sick looking. Cachectic. Resp:  No accessory muscle use GI:  Soft, non distended, non tender. :  Bilateral nephrostomy tubes in place. Bloody urine in bag Musculoskeletal:  No edema, warm Neurologic:  Mental status:AAOx3, Motor exam:Moves all extremities symmetrically Data Review:  
 Review and/or order of clinical lab test 
Review and/or order of tests in the radiology section of CPT Review and/or order of tests in the medicine section of CPT Labs:  
 
Recent Labs  
  12/27/20 
0347 12/26/20 
3845 WBC 30.6* 28.1* HGB 7.5* 7.7* HCT 23.6* 24.0*  
PLT 73* 77* Recent Labs  
  12/26/20 
0318 12/25/20 
0403  142  
K 3.3* 3.2*  
* 111* CO2 23 22 BUN 23* 25* CREA 1.03 0.89 GLU 67 72 CA 6.8* 6.5* MG 1.3*  --   
 
Recent Labs  
  12/26/20 
0318 12/25/20 
0403 ALT 30 34 * 321* TBILI 0.9 0.4 TP 5.3* 5.2* ALB 1.8* 1.7*  
GLOB 3.5 3.5 Recent Labs  
  12/26/20 
1233 12/26/20 
8304 INR 1.4* 1.4* PTP 14.8* 14.9* APTT 35.6* 37.6*  
  
 No results for input(s): FE, TIBC, PSAT, FERR in the last 72 hours. Lab Results Component Value Date/Time Folate 7.9 12/26/2020 12:33 PM  
  
No results for input(s): PH, PCO2, PO2 in the last 72 hours. No results for input(s): CPK, CKNDX, TROIQ in the last 72 hours. No lab exists for component: CPKMB Lab Results Component Value Date/Time Cholesterol, total 220 (H) 09/26/2017 04:03 PM  
 HDL Cholesterol 37 (L) 09/26/2017 04:03 PM  
 LDL, calculated 118 (H) 09/26/2017 04:03 PM  
 Triglyceride 325 (H) 09/26/2017 04:03 PM  
 
Lab Results Component Value Date/Time Glucose (POC) 81 12/23/2020 06:16 AM  
 
Lab Results Component Value Date/Time Color RED 12/22/2020 03:23 PM  
 Appearance TURBID (A) 12/22/2020 03:23 PM  
 Specific gravity 1.015 12/22/2020 03:23 PM  
 pH (UA) 6.5 12/22/2020 03:23 PM  
 Protein 100 (A) 12/22/2020 03:23 PM  
 Glucose Negative 12/22/2020 03:23 PM  
 Ketone TRACE (A) 12/22/2020 03:23 PM  
 Urobilinogen 0.2 12/22/2020 03:23 PM  
 Nitrites Positive (A) 12/22/2020 03:23 PM  
 Leukocyte Esterase MODERATE (A) 12/22/2020 03:23 PM  
 Epithelial cells FEW 12/22/2020 03:23 PM  
 Bacteria 1+ (A) 12/22/2020 03:23 PM  
 WBC 20-50 12/22/2020 03:23 PM  
 RBC >100 (H) 12/22/2020 03:23 PM  
 
 
 
Medications Reviewed:  
 
Current Facility-Administered Medications Medication Dose Route Frequency  oxyCODONE IR (ROXICODONE) tablet 10 mg  10 mg Oral Q6H PRN  
 0.9% sodium chloride infusion 250 mL  250 mL IntraVENous PRN  
 melatonin tablet 3 mg  3 mg Oral QHS PRN  
 tamsulosin (FLOMAX) capsule 0.4 mg  0.4 mg Oral DAILY  sodium chloride (NS) flush 5-40 mL  5-40 mL IntraVENous Q8H  
 sodium chloride (NS) flush 5-40 mL  5-40 mL IntraVENous PRN  
 acetaminophen (TYLENOL) tablet 650 mg  650 mg Oral Q6H PRN Or  
 acetaminophen (TYLENOL) suppository 650 mg  650 mg Rectal Q6H PRN  polyethylene glycol (MIRALAX) packet 17 g  17 g Oral DAILY PRN  
  promethazine (PHENERGAN) tablet 12.5 mg  12.5 mg Oral Q6H PRN Or  
 ondansetron (ZOFRAN) injection 4 mg  4 mg IntraVENous Q6H PRN  piperacillin-tazobactam (ZOSYN) 3.375 g in 0.9% sodium chloride (MBP/ADV) 100 mL MBP  3.375 g IntraVENous Q8H  
 lactated Ringers infusion  100 mL/hr IntraVENous CONTINUOUS  
 sodium chloride (NS) flush 5-10 mL  5-10 mL IntraVENous PRN  
 
______________________________________________________________________ EXPECTED LENGTH OF STAY: 4d 19h ACTUAL LENGTH OF STAY:          5 Philip Castorena MD

## 2020-12-27 NOTE — PROGRESS NOTES
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 Case discussed with following: . Patient complains of the following: Weak, no complaints Additional concerns noted by the staff:  
 
Patient Vitals for the past 24 hrs: 
 BP Temp Pulse Resp SpO2 Weight 12/27/20 0316 130/87 99.1 °F (37.3 °C) 97 16 94 % 62.1 kg (137 lb) 12/26/20 2320 123/84 98.5 °F (36.9 °C) (!) 102 18 97 %   
12/26/20 1947 116/88 98.6 °F (37 °C) (!) 104 18 97 %   
12/26/20 1533 121/86 98 °F (36.7 °C) (!) 101 18 98 %   
12/26/20 1156 126/79 98 °F (36.7 °C) (!) 106 18 97 %   
12/26/20 0900 117/79 98.6 °F (37 °C) 100 18 96 %   
 
ROS negative except as above Physical Examination: 
Gen NAD Resp no distress Psych normal 
 
Labs: 
Recent Results (from the past 24 hour(s)) VITAMIN B12 Collection Time: 12/26/20 12:33 PM  
Result Value Ref Range Vitamin B12 >2,000 (H) 193 - 986 pg/mL FOLATE Collection Time: 12/26/20 12:33 PM  
Result Value Ref Range Folate 7.9 5.0 - 21.0 ng/mL LD Collection Time: 12/26/20 12:33 PM  
Result Value Ref Range LD 3,478 (H) 85 - 241 U/L  
RETICULOCYTE COUNT Collection Time: 12/26/20 12:33 PM  
Result Value Ref Range Reticulocyte count 1.3 0.7 - 2.1 % Absolute Retic Cnt. 0.0366 0.0260 - 0.0950 M/ul PROTHROMBIN TIME + INR Collection Time: 12/26/20 12:33 PM  
Result Value Ref Range INR 1.4 (H) 0.9 - 1.1 Prothrombin time 14.8 (H) 9.0 - 11.1 sec PTT Collection Time: 12/26/20 12:33 PM  
Result Value Ref Range aPTT 35.6 (H) 22.1 - 31.0 sec  
 aPTT, therapeutic range     58.0 - 77.0 SECS FIBRINOGEN Collection Time: 12/26/20 12:33 PM  
Result Value Ref Range Fibrinogen 175 (L) 200 - 475 mg/dL CBC WITH AUTOMATED DIFF Collection Time: 12/27/20  3:47 AM  
Result Value Ref Range WBC 30.6 (H) 4.1 - 11.1 K/uL  
 RBC 2.63 (L) 4.10 - 5.70 M/uL HGB 7.5 (L) 12.1 - 17.0 g/dL HCT 23.6 (L) 36.6 - 50.3 % MCV 89.7 80.0 - 99.0 FL  
 MCH 28.5 26.0 - 34.0 PG  
 MCHC 31.8 30.0 - 36.5 g/dL  
 RDW 18.6 (H) 11.5 - 14.5 % PLATELET 73 (L) 975 - 400 K/uL MPV 10.1 8.9 - 12.9 FL  
 NRBC 0.6 (H) 0  WBC ABSOLUTE NRBC 0.19 (H) 0.00 - 0.01 K/uL NEUTROPHILS 93 (H) 32 - 75 % BAND NEUTROPHILS 2 0 - 6 % LYMPHOCYTES 1 (L) 12 - 49 % MONOCYTES 2 (L) 5 - 13 % EOSINOPHILS 1 0 - 7 % BASOPHILS 0 0 - 1 % METAMYELOCYTES 1 (H) 0 % IMMATURE GRANULOCYTES 0 %  
 ABS. NEUTROPHILS 29.1 (H) 1.8 - 8.0 K/UL  
 ABS. LYMPHOCYTES 0.3 (L) 0.8 - 3.5 K/UL  
 ABS. MONOCYTES 0.6 0.0 - 1.0 K/UL  
 ABS. EOSINOPHILS 0.3 0.0 - 0.4 K/UL  
 ABS. BASOPHILS 0.0 0.0 - 0.1 K/UL  
 ABS. IMM. GRANS. 0.0 K/UL  
 DF MANUAL    
 RBC COMMENTS ANISOCYTOSIS 1+ 
    
 RBC COMMENTS MACROCYTOSIS 1+ 
    
 RBC COMMENTS POLYCHROMASIA 1+ Assessment and Plan: 
Recurrent Head and neck ca 
-Dr. Seven Waters as offered him treatment with Opdivo after discharge Anemia -HBG stable 
-Cont to monitor 
-LDH up most likely due to his Ca, retic and bili normal so doubt any hemolysis, check haptoglobin Thrombocytopenia 
-may be due to tx 
-May have low grade DIC due to his Ca, fibrinogen 175, coags up slightly, copper, zinc pending 
-PLTs stable

## 2020-12-28 NOTE — DISCHARGE INSTRUCTIONS
Discharge Instructions       PATIENT ID: Davi Pierce. MRN: [id number]   YOB: 1975    DATE OF ADMISSION: 12/22/2020  2:51 PM    DATE OF DISCHARGE: 12/28/2020    PRIMARY CARE PROVIDER: Dorinda Flores MD     ATTENDING PHYSICIAN: Meggan Raymond MD  DISCHARGING PROVIDER: Renita Bloom MD    To contact this individual call [phone] and ask the  to page. If unavailable ask to be transferred the Adult Hospitalist Department. DISCHARGE DIAGNOSES metastatic cancer. B/l Nephrostomy tube leaking- replaced. UTI/sepsis. CONSULTATIONS: IP CONSULT TO UROLOGY  IP CONSULT TO HEMATOLOGY  IP CONSULT TO INTERVENTIONAL RADIOLOGY    PROCEDURES/SURGERIES: * No surgery found *    FOLLOW UP APPOINTMENTS:   Follow-up Information     Follow up With Specialties Details Why Contact Info    Dorinda Flores MD Family Medicine In 1 week  [address] [place] [address]  [phone]      Samuel Malone MD Hematology and Oncology In 3 days  [address] [phone]             ADDITIONAL CARE RECOMMENDATIONS: follow up with PCP and oncology 'closely'    DIET: Resume previous diet    DISCHARGE MEDICATIONS:  Finish abx course po. · It is important that you take the medication exactly as they are prescribed. · Keep your medication in the bottles provided by the pharmacist and keep a list of the medication names, dosages, and times to be taken in your wallet. · Do not take other medications without consulting your doctor. NOTIFY YOUR PHYSICIAN FOR ANY OF THE FOLLOWING:   Fever over 101 degrees for 24 hours. Chest pain, shortness of breath, fever, chills, nausea, vomiting, diarrhea, change in mentation, falling, weakness, bleeding. Severe pain or pain not relieved by medications. Or, any other signs or symptoms that you may have questions about.   It was our pleasure to help take care of you and we hope you get well very soon.    DISPOSITION:    Home With:   OT  PT  HH  RN       SNF/Inpatient Rehab/LTAC   x Independent/assisted living    Hospice    Other:     CDMP Checked:   Yes x     PROBLEM LIST Updated:  Yes x     Signed:   Cherie Valero MD  12/28/2020  11:41 AM

## 2020-12-28 NOTE — ROUTINE PROCESS
Hospital follow-up PCP transitional care appointment has been scheduled with MARTI Vora for Dec 30 @ 1030AM (Dr. Adrianne Elkins is booked for the rest of Dec and all of next month). Pending patient discharge.   Fabien Medina, Care Management Specialist.

## 2020-12-28 NOTE — PROGRESS NOTES
Problem: Mobility Impaired (Adult and Pediatric) Goal: *Acute Goals and Plan of Care (Insert Text) Description: FUNCTIONAL STATUS PRIOR TO ADMISSION: Patient was independent and active without use of DME. 
 
HOME SUPPORT PRIOR TO ADMISSION: The patient lived with family. Physical Therapy Goals Initiated 12/28/2020 1. Patient will move from supine to sit and sit to supine  in bed with supervision/set-up within 7 day(s). 2.  Patient will transfer from bed to chair and chair to bed with supervision/set-up using the least restrictive device within 7 day(s). 3.  Patient will perform sit to stand with supervision/set-up within 7 day(s). 4.  Patient will ambulate with supervision/set-up for 100 feet with the least restrictive device within 7 day(s). 5.  Patient will ascend/descend 4 stairs with bilateral handrail(s) with minimal assistance/contact guard assist within 7 day(s). Outcome: Progressing Towards Goal 
 PHYSICAL THERAPY EVALUATION Patient: Darrin Hernandez (38 y.o. male) Date: 12/28/2020 Primary Diagnosis: Sepsis ([facility] [address] [A41.9] Precautions:   Fall ASSESSMENT Based on the objective data described below, the patient presents with generalized weakness, impaired balance and gait, decreased tolerance to activity s/p admission on 12/22 with sepsis. Pt with history of bilateral nephrostomy tubes and metastatic squamous cell carcinoma. Pt tolerated gait training in the room with CGA-SBA demonstrating wide ROHINI, minor instability, but no overt LOB. Noted HR at rest was elevated to 120's and increased to 165 bpm during activity. Pt required prolonged seated rest break to recover before returning to supine position. Educated pt and spouse on energy conservation techniques. Pt will continue to benefit from PT to progress mobility as tolerated and able. Recommend home with HHPT and family assistance upon discharge. Olivia Bowling Current Level of Function Impacting Discharge (mobility/balance): SBA-CGA transfers and gait training; HR elevated to 165 bpm during gait training Functional Outcome Measure: The patient scored Total Score: 23/28 on the Tinetti outcome measure which is indicative of moderate fall risk. Other factors to consider for discharge: metastatic cancer Patient will benefit from skilled therapy intervention to address the above noted impairments. PLAN : 
Recommendations and Planned Interventions: bed mobility training, transfer training, gait training, therapeutic exercises, neuromuscular re-education, edema management/control, patient and family training/education, and therapeutic activities Frequency/Duration: Patient will be followed by physical therapy:  3 times a week to address goals. Recommendation for discharge: (in order for the patient to meet his/her long term goals) Physical therapy at least 2 days/week in the home AND ensure assist and/or supervision for safety with mobility This discharge recommendation: 
Has been made in collaboration with the attending provider and/or case management IF patient discharges home will need the following DME: patient owns DME required for discharge SUBJECTIVE:  
Patient stated Let's do this.  OBJECTIVE DATA SUMMARY:  
HISTORY:   
Past Medical History:  
Diagnosis Date Cancer ([facility] [address] Past Surgical History:  
Procedure Laterality Date IR CHANGE EXTRNL NEPHROURETERAL CATH SI  12/23/2020 Personal factors and/or comorbidities impacting plan of care:  
 
Home Situation Home Environment: Private residence # Steps to Enter: 4 Rails to Enter: Yes Hand Rails : Bilateral 
One/Two Story Residence: Two story, live on 1st floor # of Interior Steps: 16 Living Alone: No 
Support Systems: Spouse/Significant Other/Partner, Child(angel), Family member(s) Patient Expects to be Discharged to[de-identified] Private residence Current DME Used/Available at Home: Hospital bed, [address], rollator EXAMINATION/PRESENTATION/DECISION MAKING:  
Critical Behavior: 
  
  
  
  
Hearing: Auditory Auditory Impairment: None Skin:   
Edema:  
Range Of Motion: 
AROM: Within functional limits Strength:   
Strength: Generally decreased, functional 
  
  
  
  
  
  
Tone & Sensation:  
  
  
  
  
  
Sensation: Impaired(c/o tingling in bilateral toes) Coordination: 
  
Vision:  
  
Functional Mobility: 
Bed Mobility: 
  
Supine to Sit: Stand-by assistance; Additional time; Adaptive equipment Sit to Supine: Stand-by assistance; Additional time; Adaptive equipment Transfers: 
Sit to Stand: Stand-by assistance Stand to Sit: Stand-by assistance Balance:  
Sitting: Intact Standing: Impaired Standing - Static: Good Standing - Dynamic : Good;Fair Ambulation/Gait Training: 
Distance (ft): 30 Feet (ft) Assistive Device: Gait belt Ambulation - Level of Assistance: Contact guard assistance;Stand-by assistance Gait Abnormalities: Decreased step clearance Base of Support: Widened Speed/Shanelle: Pace decreased (<100 feet/min) Step Length: Right shortened;Left shortened Stairs: Therapeutic Exercises:  
 
 
Functional Measure: 
Tinetti test: 
 
Sitting Balance: 1 Arises: 1 Attempts to Rise: 2 Immediate Standing Balance: 2 Standing Balance: 2 Nudged: 2 Eyes Closed: 1 Turn 360 Degrees - Continuous/Discontinuous: 1 Turn 360 Degrees - Steady/Unsteady: 1 Sitting Down: 1 Balance Score: 14 Balance total score Indication of Gait: 1 
R Step Length/Height: 1 L Step Length/Height: 1 
R Foot Clearance: 1 L Foot Clearance: 1 Step Symmetry: 1 Step Continuity: 1 Path: 1 Trunk: 1 Walking Time: 0 Gait Score: 9 Gait total score Total Score: 23/28 Overall total score Tinetti Tool Score Risk of Falls 
<19 = High Fall Risk 19-24 = Moderate Fall Risk 25-28 = Low Fall Risk Estrellita CERVANTES. Performance-Oriented Assessment of Mobility Problems in Elderly Patients. [address] 66; [id number]. (Scoring Description: PT Bulletin Feb. 10, 1993) Older adults: Shanta Alvarado et al, 2009; n = [address] elderly evaluated with ABC, ANIYAH, ADL, and IADL) · Mean ANIYAH score for males aged 69-68 years = 26.21(3.40) · Mean ANIYAH score for females age 69-68 years = 25.16(4.30) · Mean ANIYAH score for males over 80 years = 23.29(6.02) · Mean ANIYAH score for females over 80 years = 17.20(8.32) Based on the above components, the patient evaluation is determined to be of the following complexity level: LOW Pain Rating: 
Pt denied pain Activity Tolerance:  
Fair and requires rest breaks After treatment patient left in no apparent distress:  
Supine in bed, Call bell within reach, and Caregiver / family present COMMUNICATION/EDUCATION:  
The patients plan of care was discussed with: Registered nurse and Case management. Fall prevention education was provided and the patient/caregiver indicated understanding., Patient/family have participated as able in goal setting and plan of care. , and Patient/family agree to work toward stated goals and plan of care. Thank you for this referral. 
Sadaf Hardy, PT, DPT Time Calculation: 23 mins

## 2020-12-28 NOTE — PROGRESS NOTES
0730: Bedside shift change report given to [address] [place] [address],[address] (oncoming nurse) by Harpal Denise RN (offgoing nurse). Report included the following information SBAR, Kardex, Intake/Output, MAR, Recent Results and Cardiac Rhythm NSR.  
 
1616: Port flushed with heparin and removed 1637: I have reviewed discharge instructions with the patient and spouse. The patient and spouse verbalized understanding.

## 2020-12-28 NOTE — PROGRESS NOTES
Transitions of Care: 15% risk of re-admission  
 
 -PT/OT evaluations pending for discharge, anticipate discharge with home health services The CM spoke with the attending MD- would like to discharge the patient once evaluated by PT/OT, would like home health services. The CM met with the patient at bedside- he is agreeable for home health services, does not have any preference on agency- CM sent referral to [facility]. The patient has nephrostomy tube and PEG tube- RN/MD endorse that no changes were made to PEG feedings, patient gets his supplies from [facility]. The patient endorses family assisting with nephrostomy management. CM will follow-up, spouse to transport- awaiting therapy evaluations. MD endorses PO antibiotics at discharge. 12:25 p.m.- CM spoke with PT, okay for discharge with home health services. Patient has rollator and hospital bed, [facility] provide [facility] since PCP is off today. [facility] denied, cannot service patient. 14:48 p.m.- [facility] has accepted, [facility] on Thursday- the CM discussed with MD and agreeable, also called the patient's wife and she is okay with [facility] on Thursday, family okay with discharge today- would like call from MD to address medical questions. MD aware. Transition of Care Plan: The Plan for Transition of Care is related to the following treatment goals: Home with home health The Patient was provided with a choice of provider and agrees  with the discharge plan. Yes [x] No [] A Freedom of choice list was provided with basic dialogue that supports the patient's individualized plan of care/goals and shares the quality data associated with the providers. Yes [x] No [] QUINN Pantoja

## 2020-12-28 NOTE — DISCHARGE SUMMARY
Discharge Summary PATIENT ID: Karlee Samano. MRN: [id number] YOB: 1975 DATE OF ADMISSION: 12/22/2020  2:51 PM   
DATE OF DISCHARGE: 12/28/2020 PRIMARY CARE PROVIDER: Gina Pineda MD  
 
ATTENDING PHYSICIAN: Connor Cordero MD 
DISCHARGING PROVIDER: Connor Cordero MD   
To contact this individual call [phone] and ask the  to page. If unavailable ask to be transferred the Adult Hospitalist Department. CONSULTATIONS: IP CONSULT TO UROLOGY 
IP CONSULT TO HEMATOLOGY 
IP CONSULT TO INTERVENTIONAL RADIOLOGY PROCEDURES/SURGERIES: * No surgery found * [id number] [facility] COURSE:  
Evaluated and treated for sepsis, metastatic cancer. Improved. Chronically very sick, but since pretty stable, will get him discharged as oncology can offer him some treatment this week. I spoke to his wife on phone, answered her questions as I could. Explained he remains chronically very sick and unfortunately high risk re-admission. Given Oncology plans for Op treatment it is best he gets f/u soon. Also f/u with ophthalmology closely for blurry eye symptoms. Risk of Re-Admission: very high DISCHARGE DIAGNOSES / PLAN:   
 
#. S/p B/l Nephrostomy tubes replacement: was leaking on admit. #. UTI: UA suggestive. Empiric iv abx. #. Severe Sepsis: 2nd to above. Elevated wcc/HR. BCs -ve. - resolved. #. Lactic acidosis: not resolving, hemodynamically stable, likely D- lactic. Stop checking - Zosyn course. Switched to po augmentin on dc. 
  
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 
- CT cap: new hepatic metastases, new bone metastasis, new b/l pleural effusion.  
- Oncology following. Plans for op chemo that should help with his survival. Will f/u in next 1-2days on dc. 
  
Blurred vision worse on the left. CT head: NAD. Per wife recent brain MRI: No mets. F/u op with Ophthalmology Hypomagnesemia: Replace. monitor Normocytic anemia and thrombocytopenia: likely cancer related. Tx Per Oncology 
- HB dropped to 6.3, s/p 1unit RBC. Monitor HB, stable few days prior to dc. FOLLOW UP APPOINTMENTS:   
Follow-up Information None ADDITIONAL CARE RECOMMENDATIONS:  Follow up with PCP and ONcology DIET: Resume previous diet ACTIVITY: Activity as tolerated DISCHARGE MEDICATIONS: 
Current Discharge Medication List  
  
 
NOTIFY YOUR PHYSICIAN FOR ANY OF THE FOLLOWING:  
Fever over 101 degrees for 24 hours. Chest pain, shortness of breath, fever, chills, nausea, vomiting, diarrhea, change in mentation, falling, weakness, bleeding. Severe pain or pain not relieved by medications. Or, any other signs or symptoms that you may have questions about. DISPOSITION: 
  Home With: 
 OT  PT  HH  RN  
  
 Long term SNF/Inpatient Rehab  
x Independent/assisted living Hospice Other:  
 
PATIENT CONDITION AT DISCHARGE:  
 
Functional status Poor Deconditioned Independent Cognition Albin Choi Forgetful Dementia Catheters/lines (plus indication) Gonzalez PICC   
 PEG None Code status Full code DNR   
 
PHYSICAL EXAMINATION AT DISCHARGE: 
Patient seen and examined at bedside, Condition stable, explained discharge and follow up plans. /84 (BP 1 Location: Right arm, BP Patient Position: At rest)   Pulse (!) 102   Temp 98.1 °F (36.7 °C)   Resp 18   Ht 6' 0.01\" (1.829 m)   Wt 62.1 kg (137 lb)   SpO2 96%   BMI 18.58 kg/m² General:  Alert, oriented, No acute distress. Keen on dc. Resp:  No accessory muscle use, Good AE. Neuro:  Grossly normal, no focal neuro deficits, follows commands CHRONIC MEDICAL DIAGNOSES: 
Problem List as of 12/28/2020 Date Reviewed: 12/23/2020 Codes Class Noted - Resolved * (Principal) [facility] ICD-10-CM: A41.9 ICD-9-CM: 038.9, 995.91  12/22/2020 - Present Urinary retention ICD-10-CM: R33.9 ICD-9-CM: 788.20  9/28/2017 - Present 36 minutes were spent with the patient on counseling and coordination of care.  
 
Signed:  
Nancy Gaines MD 
12/28/2020 
11:30 AM

## 2020-12-31 NOTE — ADT AUTH CERT NOTES
12/27/20  progress note by Lindy Narayan, RN Review Entered Review Status 12/30/2020 11:39 In Primary Criteria Review 12/27/20  inpt Hem/onc Assessment and Plan: 
 
Recurrent Head and neck ca 
 
-Dr. Mynor Frost as offered him treatment with Opdivo after discharge Anemia -HBG stable 
 
-Cont to monitor 
 
-LDH up most likely due to his Ca, retic and bili normal so doubt any hemolysis, check haptoglobin Thrombocytopenia 
 
-may be due to tx 
 
-May have low grade DIC due to his Ca, fibrinogen 175, coags up slightly, copper, zinc pending 
 
-PLTs stable Hospitalist note Interval history / Subjective: Follow-up for sepsis. Leaking bilateral percutaneous nephrostomy's. Pt seen and examined. Feels very very weak. His pain has much improved since IR removed skin stitch on L nephrostomy site. Assessment & Plan:  
 
 
#. S/p B/l Nephrostomy tubes replacement: was leaking on admit. #. UTI: UA suggestive, Urine culture pending. Empiric iv abx. #. Severe Sepsis: 2nd to above. Elevated wcc/HR. BCs NGTD #. Lactic acidosis: not resolving, hemodynamically stable, likely D- lactic. Stop checking - Zosyn. 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 
 
- CT cap: new hepatic metastases, new bone metastasis, new b/l pleural effusion.  
 
- Oncology following. Plans for op chemo. Blurred vision worse on the left. CT head: NAD. Per wife recent brain MRI: No mets Hypomagnesemia: Replace. monitor Normocytic anemia and thrombocytopenia: likely cancer related. Tx Per Oncology 
 
- HB dropped to 6.3, s/p 1unit RBC. Monitor HB, transfuse if <7. Code status: Full code. Wife Vandana Erickson [phone] DVT prophylaxis: Heparin Care Plan discussed with: Patient/Family and Nurse Anticipated Disposition: Home w/Family Greater than 48 hours Hospital Problems Date Reviewed: 12/23/2020 Codes Class Noted POA * (Principal) Sepsis ([facility] ICD-10-CM: A41.9 ICD-9-CM: 038.9, 995.91  
 
   
 
12/22/2020 Yes Review of Systems: A comprehensive review of systems was negative except for that written in the HPI. Vital Signs:  
 
 
 Last 24hrs VS reviewed since prior progress note. Most recent are: 
 
Visit Vitals BP  
 
130/87 (BP 1 Location: Right arm, BP Patient Position: At rest) Pulse 2050 [employer] Temp  
 
99.1 °F (37.3 °C) Resp 217 [facility]  
 
6' 0.01\" (1.829 m) Wt  
 
62.1 kg (137 lb) SpO2  
 
94% BMI  
 
18.58 kg/m² Recent Labs  
 
 
   
 
12/27/20 
 
0347  
 
12/26/20 
 
4839 WBC  
 
30.6*  
 
28.1* HGB  
 
7.5*  
 
7.7* HCT  
 
23.6*  
 
24.0*  
 
 
PLT  
 
73*  
 
77* Current Facility-Administered Medications Medication Dose Route Frequency   
 
oxyCODONE IR (ROXICODONE) tablet 10 mg  
 
 10 mg  
 
Oral  
 
Q6H PRN  
 
 
  
 
0.9% sodium chloride infusion 250 mL  
 
 250 mL IntraVENous PRN  
 
 
  
 
melatonin tablet 3 mg  
 
 3 mg Oral  
 
QHS PRN  
 
 
  
 
tamsulosin (FLOMAX) capsule 0.4 mg  
 
 0.4 mg  
 
Oral  
 
DAILY   
 
sodium chloride (NS) flush 5-40 mL  
 
 5-40 mL IntraVENous Q8H  
 
 
  
 
sodium chloride (NS) flush 5-40 mL  
 
 5-40 mL IntraVENous PRN  
 
 
  
 
acetaminophen (TYLENOL) tablet 650 mg  
 
 650 mg  
 
Oral  
 
Q6H PRN Or  
 
 
  
 
acetaminophen (TYLENOL) suppository 650 mg  
 
 650 mg Rectal  
 
Q6H PRN   
 
polyethylene glycol (MIRALAX) packet 17 g  
 
 17 g Oral  
 
 DAILY PRN   
 
promethazine (PHENERGAN) tablet 12.5 mg  
 
 12.5 mg  
 
Oral  
 
Q6H PRN Or  
 
 
  
 
ondansetron (ZOFRAN) injection 4 mg 4 mg IntraVENous Q6H PRN   
 
piperacillin-tazobactam (ZOSYN) 3.375 g in 0.9% sodium chloride (MBP/ADV) 100 mL MBP  
 
 3.375 g IntraVENous Q8H  
 
 
  
 
lactated Ringers infusion 100 mL/hr IntraVENous CONTINUOUS  
 
 
  
 
sodium chloride (NS) flush 5-10 mL  
 
 5-10 mL IntraVENous PRN  
 
 
  
 
   
 
 
  
 
 
 
12/26/20  progress note by Herbie Brandt RN Review Entered Review Status 12/30/2020 11:33 In Primary Criteria Review 12/26/20  inpt Hem/onc note Assessment and Plan: 
 
Recurrent Head and neck ca 
 
-Dr. Milady Bird as offered him treatment with Opdivo after discharge Anemia 
 
-Patient got 1U PRBCs yesterday, HBG better, transfuse if less than 7 
 
-Cont to monitor Thrombocytopenia 
 
-may be due to tx 
 
-check B12/folate, check for DIC, copper, zinc 
 
  
 
  
 
  
 
  
 
Hospitalist note Interval history / Subjective: Follow-up for sepsis. Leaking bilateral percutaneous nephrostomy's. Pt seen and examined. Feels lots of pain in L nephrostomy tube side, also b/l nephrostomy tubes draining very bloody urine, and left side leaking++ per RN. Will call IR to evaluate. No signs of infection, HB stable. Will aim to dc him for Ca treatment next week Assessment & Plan:  
 
 
#. B/l Nephrostomy tubes in situ: leaking on admit. IR replaced. #. UTI: UA suggestive, Urine culture pending. Empiric iv abx. #. Severe Sepsis: 2nd to above. Elevated wcc/HR. BCs NGTD #. Lactic acidosis: not resolving, hemodynamically stable, likely D- lactic. Stop checking - Zosyn. Dc vancomycin Widely metastatic squamous cell carcinoma of the head and neck - Just was discharged from cancer center in [facility] last Sunday 
 
- CT cap: new hepatic metastases, new bone metastasis, new b/l pleural effusion.  
 
- Oncology following. Plans for op chemo. Blurred vision worse on the left. CT head: NAD. Per wife recent brain MRI: No mets Hypomagnesemia: Replace. Normocytic anemia and thrombocytopenia: likely cancer related. Tx Per Oncology 
 
- HB dropped to 6.3, s/p 1unit RBC. Monitor HB, transfuse if <7. Code status: Full code. Wife Amaya Strickland [phone] DVT prophylaxis: Heparin Care Plan discussed with: Patient/Family and Nurse Anticipated Disposition: Home w/Family Greater than 48 hours Hospital Problems Date Reviewed: 12/23/2020 Codes Class Noted POA * (Principal) Sepsis ([facility] ICD-10-CM: A41.9 ICD-9-CM: 038.9, 995.91  
 
   
 
12/22/2020 Yes Vital Signs:  
 
 
 Last 24hrs VS reviewed since prior progress note. Most recent are: 
 
Visit Vitals BP  
 
138/86 (BP 1 Location: Right arm, BP Patient Position: At rest) Pulse 80 Temp 98.8 °F (37.1 °C) Resp 25 Ht 6' 0.01\" (1.829 m) Wt  
 
64.4 kg (141 lb 15.6 oz) SpO2  
 
95% BMI  
 
19.25 kg/m² Labs:  
 
 
  
 
 
   
 
   
 
   
 
 
Recent Labs  
 
 
   
 
12/26/20 
 
0318  
 
12/25/20 
 
0403 WBC  
 
28.1*  
 
25.5* HGB  
 
7.7*  
 
6.5* HCT  
 
24.0*  
 
20.2* PLT 77*  
 
83* Recent Labs  
 
 
   
 
12/26/20 
 
0318  
 
12/25/20 
 
0403  
 
12/24/20 
 
0305 NA  
 
142  
 
142  
 
142  
 
 
K  
 
3.3*  
 
3.2*  
 
3.8 CL  
 
111*  
 
111*  
 
110* CO2  
 
23  
 
22  
 
21 BUN  
 
23*  
 
25*  
 
33* CREA  
 
1.03  
 
0.89  
 
1.20 GLU  
 
67  
 
72  
 
85  
 
 
CA  
 
6.8*  
 
6.5*  
 
7.1*  
 
 
MG  
 
1.3*  
 
 --   
 
 --   
 
 
  
 
 
   
 
   
 
   
 
   
 
 
Recent Labs  
 
 
   
 
12/26/20 
 
0318  
 
12/25/20 
 
0403  
 
12/24/20 
 
0305 ALT  
 
30  
 
34  
 
37 AP  
 
318*  
 
321*  
 
316* TBILI  
 
0.9  
 
0.4  
 
0.4 TP  
 
5.3*  
 
5.2*  
 
5.5* ALB  
 
1.8*  
 
1.7*  
 
1.9*  
 
 
GLOB  
 
3.5  
 
3.5  
 
3.6 Recent Labs  
 
 
   
 
12/26/20 
 
0968 INR  
 
1.4* PTP  
 
14.9* APTT  
 
37.6* Current Facility-Administered Medications Medication Dose Route Frequency   
 
0.9% sodium chloride infusion 250 mL  
 
 250 mL IntraVENous PRN  
 
 
  
 
oxyCODONE IR (ROXICODONE) tablet 5 mg  
 
 5 mg Oral  
 
Q6H PRN  
 
 
  
 
melatonin tablet 3 mg  
 
 3 mg Oral  
 
QHS PRN  
 
 
  
 
tamsulosin (FLOMAX) capsule 0.4 mg  
 
 0.4 mg  
 
Oral  
 
DAILY   
 
sodium chloride (NS) flush 5-40 mL  
 
 5-40 mL IntraVENous Q8H  
 
 
  
 
sodium chloride (NS) flush 5-40 mL  
 
 5-40 mL IntraVENous PRN  
 
 
  
 
acetaminophen (TYLENOL) tablet 650 mg  
 
 650 mg  
 
Oral  
 
Q6H PRN Or  
 
 
  
 
acetaminophen (TYLENOL) suppository 650 mg  
 
 650 mg Rectal  
 
Q6H PRN   
 
polyethylene glycol (MIRALAX) packet 17 g  
 
 17 g Oral  
 
DAILY PRN   
 
promethazine (PHENERGAN) tablet 12.5 mg  
 
 12.5 mg  
 
Oral  
 
Q6H PRN Or  
 
 
  
 
ondansetron (ZOFRAN) injection 4 mg 4 mg IntraVENous Q6H PRN   
 
piperacillin-tazobactam (ZOSYN) 3.375 g in 0.9% sodium chloride (MBP/ADV) 100 mL MBP  
 
 3.375 g IntraVENous Q8H  
 
 
  
 
lactated Ringers infusion 150 mL/hr IntraVENous CONTINUOUS  
 
 
  
 
sodium chloride (NS) flush 5-10 mL  
 
 5-10 mL IntraVENous PRN  
 
 
   
 
 
  
 
 
 
 12/25/20  progress note by Rosy Aguilar, RN Review Entered Review Status 12/30/2020 11:29 In Primary Criteria Review 12/25/20  inpt Hospitalist note Interval history / Subjective: Follow-up for sepsis. Leaking bilateral percutaneous nephrostomy's. Pt seen and examined. Feels weak, lactic is coming down. HB dropped to 6.3, will need a unit of RBC. Consent signed. Assessment & Plan:  
 
 
#. B/l Nephrostomy tubes in situ: leaking on admit. IR replaced. #. UTI: UA suggestive, Urine culture pending. Empiric iv abx. #. Severe Sepsis: 2nd to above. Elevated wcc/HR. With lactic acidosis. BCs NGTD 
 
- Zosyn. 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 
 
-  just was discharged from cancer center in [place] [facility] last Sunday 
 
- CT cap: new hepatic metastases, new bone metastasis, new b/l pleural effusion.  
 
- Oncology following. Plans for op chemo. Blurred vision worse on the left. CT head: NAD. Per wife recent brain MRI: No mets Hypomagnesemia: Replace. Normocytic anemia and thrombocytopenia: likely cancer related. Tx Per Oncology 
 
- HB dropped to 6.3, will transfuse 1unit RBC. Monitor HB, transfuse if <7. Code status: Full code. Wife Melida Alcala [phone] DVT prophylaxis: Heparin BP  
 
123/85 (BP 1 Location: Right arm, BP Patient Position: At rest) Pulse (!) 104 Temp 98.4 °F (36.9 °C) Resp 12 Ht  
 
6' 0.01\" (1.829 m) Wt  
 
64.4 kg (141 lb 15.6 oz) SpO2  
 
99% 12/25/20 
 
0403  
 
12/24/20 
 
0305 WBC  
 
25.5*  
 
30.2* HGB  
 
6.5*  
 
7.1*  
 
 
HCT  
 
20.2*  
 
22.5* PLT 83*  
 
98* Recent Labs  
 
 
   
 
12/25/20 
 
0403  
 
12/24/20 
 
0305  
 
12/23/20 
 
0357 NA  
 
142  
 
142  
 
140  
 
 
K  
 
3.2*  
 
3.8 3.9  
 
 
CL  
 
111*  
 
110*  
 
109* CO2  
 
22  
 
21  
 
22 BUN  
 
25*  
 
33*  
 
30* CREA  
 
0.89  
 
1.20  
 
0.96  
 
 
GLU  
 
72  
 
85  
 
59* CA  
 
6.5*  
 
7.1*  
 
7.4* MG  
 
 --   
 
 --   
 
1.4* PHOS  
 
 --   
 
 --   
 
4.8* Recent Labs  
 
 
   
 
12/25/20 
 
0403  
 
12/24/20 
 
0305  
 
12/23/20 
 
0357 ALT  
 
34  
 
37  
 
42 AP  
 
321*  
 
316*  
 
352* TBILI  
 
0.4  
 
0.4  
 
0.4 TP  
 
5.2*  
 
5.5*  
 
5.8* ALB  
 
1.7*  
 
1.9*  
 
2.1*  
 
 
GLOB  
 
3.5  
 
3.6  
 
3.7 LPSE  
 
 --   
 
 --   
 
176 Current Facility-Administered Medications Medication Dose Route Frequency   
 
0.9% sodium chloride infusion 250 mL  
 
 250 mL IntraVENous PRN  
 
 
  
 
melatonin tablet 3 mg  
 
 3 mg Oral  
 
QHS PRN  
 
 
  
 
tamsulosin (FLOMAX) capsule 0.4 mg  
 
 0.4 mg  
 
Oral  
 
DAILY   
 
sodium chloride (NS) flush 5-40 mL  
 
 5-40 mL IntraVENous Q8H  
 
 
  
 
sodium chloride (NS) flush 5-40 mL  
 
 5-40 mL IntraVENous PRN  
 
 
  
 
acetaminophen (TYLENOL) tablet 650 mg  
 
 650 mg  
 
Oral  
 
Q6H PRN Or  
 
 
  
 
acetaminophen (TYLENOL) suppository 650 mg  
 
 650 mg Rectal  
 
Q6H PRN   
 
polyethylene glycol (MIRALAX) packet 17 g  
 
 17 g Oral  
 
DAILY PRN   
 
promethazine (PHENERGAN) tablet 12.5 mg  
 
 12.5 mg  
 
Oral  
 
Q6H PRN Or  
 
 
  
 
ondansetron (ZOFRAN) injection 4 mg 4 mg IntraVENous Q6H PRN  
 
 
  
 
heparin (porcine) injection 5,000 Units 5,000 Units SubCUTAneous Q8H   
 
piperacillin-tazobactam (ZOSYN) 3.375 g in 0.9% sodium chloride (MBP/ADV) 100 mL MBP  
 
 3.375 g IntraVENous Q8H  
 
 
  
 
lactated Ringers infusion 150 mL/hr IntraVENous CONTINUOUS  
 
 
  
 sodium chloride (NS) flush 5-10 mL  
 
 5-10 mL IntraVENous PRN  
 
 
  
 
   
 
 
  
 
 
 
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 1.  Metastatic recurrence of head-neck cancer: This young patient deserves a chance to be exposed to PD-L1 treatment. This is FDA approved for recurrent disease and that will help the survival ascent too, meaning that a significant percent of patients have prolonged survival.  See graph below that I placed in the chart. I have set him up for treatment in my office next week and told him that he is free to receive his treatment wherever he would like, but we would like to help him as soon as possible given his extensive disease and the potential efficacy of this treatment. Hospitalist note Interval history / Subjective: Follow-up for sepsis. Leaking bilateral percutaneous nephrostomy's. Pt seen and examined. Feels ok, no fever/chills. No acute events overnight, comfortable, though remains in sinus tachycardia. Looks chronically sick and older than stated age. Assessment & Plan:  
 
 
#. B/l Nephrostomy tubes in situ: leaking on admit. IR replaced. #. UTI: UA suggestive, Urine culture pending. Empiric iv abx. #. Severe Sepsis: 2nd to above. Elevated wcc/HR. With lactic acidosis. BCs NGTD 
 
- Zosyn. We will dc vancomycin if blood culture remains negative for 48 hours. 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 
 
-  just was discharged from cancer center in [facility] last Sunday 
 
- CT cap: new hepatic metastases, new bone metastasis, new b/l pleural effusion.  
 
- Oncology following. Blurred vision worse on the left. CT head: NAD. Per wife recent brain MRI: No mets Hypomagnesemia: Replace. Normocytic anemia and thrombocytopenia: likely cancer related. Tx Per Oncology Code status: Full code. Wife Ilana Rodas [phone] DVT prophylaxis: Heparin 12/23/2020 ATTENDING NOTE by Adelaida Richmond RN Review Entered Review Status 12/24/2020 14:51 In Primary Criteria Review 12/23/2020 ATTENDING NOTE 
 
 
head and neck presented to the ER for leaking nephrostomy tube. Patient has bilateral percutaneous nephrostomy tubes. In the ER he was found to be septic due to UTI. Interval history / Subjective: Follow-up for sepsis. Leaking bilateral percutaneous nephrostomy's Patient's chronically sick looking, weak but alert oriented. He says both nephrostomy sites leaking. Here for no complaints otherwise. He denied pain, fever or chills. No nausea or vomiting. Updated his wife on the phone. Ilana Rodas phone: [phone] Assessment & Plan:  
 
 
Sepsis (with SIRS features of leukocytosis, lactic acidosis and tachycardia) most probably due to UTI with bilateral ureteral stents and percutaneous nephrostomy in situ 
 
-Sepsis present completed. Blood cultures in process. Urine culture was not sent. Add on ordered. 
 
-Continue with Zosyn. We will continue vancomycin if blood culture remains negative for 48 hours. 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 
 
-CT abdomen showed new hepatic metastases, new bone metastasis, new bilateral pleural effusion. Head CT negative 
 
-Oncology consulted. Blurred vision worse on the left. Head CT is negative. Per his wife, had brain imaging including MRI at the cancer treatment centers of [facility] and no metastatic disease was detected. Hypomagnesemia: Replace. Normocytic anemia most probably secondary to anemia of chronic disease. This could be worked up and followed up as outpatient. Mild thrombocytopenia which could be sepsis, cancer, cancer treatment. No bleeding. Monitor Code status: Full code DVT prophylaxis: Heparin Constitutional:  
 
 Weak and chronically sick looking. Not in distress. HEENT:  
 
 Atraumatic. Oral mucosa moist,. Non icteric sclera. No pallor. Resp:  
 
 CTA bilaterally. No wheezing/rhonchi/rales. No accessory muscle use Chest Wall:  
 
No deformity CV:  
 
 Regular rhythm, normal rate, no murmurs, gallops, rubs GI:  
 
 Soft, non distended, non tender. normoactive bowel sounds, no hepatosplenomegaly :  
 
 Bilateral nephrostomy tubes in place. Dressing soaked with urine. Musculoskeletal: No edema, warm, 2+ pulses throughout Neurologic:  
 
 Mental status:AAOx3,  
 
Cranial nerves II-XII : WNL Motor exam:Moves all extremities symmetrically

## 2021-01-01 ENCOUNTER — APPOINTMENT (OUTPATIENT)
Dept: CT IMAGING | Age: 46
DRG: 698 | End: 2021-01-01
Attending: EMERGENCY MEDICINE

## 2021-01-01 ENCOUNTER — APPOINTMENT (OUTPATIENT)
Dept: GENERAL RADIOLOGY | Age: 46
DRG: 698 | End: 2021-01-01
Attending: PHYSICIAN ASSISTANT

## 2021-01-01 ENCOUNTER — APPOINTMENT (OUTPATIENT)
Dept: CT IMAGING | Age: 46
DRG: 698 | End: 2021-01-01
Attending: INTERNAL MEDICINE

## 2021-01-01 ENCOUNTER — HOSPITAL ENCOUNTER (INPATIENT)
Age: 46
LOS: 1 days | DRG: 698 | End: 2021-01-08
Attending: EMERGENCY MEDICINE | Admitting: INTERNAL MEDICINE

## 2021-01-01 ENCOUNTER — HOSPITAL ENCOUNTER (OUTPATIENT)
Dept: INTERVENTIONAL RADIOLOGY/VASCULAR | Age: 46
Discharge: HOME | End: 2021-01-04
Attending: RADIOLOGY | Admitting: RADIOLOGY

## 2021-01-01 VITALS
TEMPERATURE: 98.1 F | HEART RATE: 100 BPM | OXYGEN SATURATION: 93 % | WEIGHT: 140 LBS | DIASTOLIC BLOOD PRESSURE: 78 MMHG | BODY MASS INDEX: 18.98 KG/M2 | RESPIRATION RATE: 20 BRPM | SYSTOLIC BLOOD PRESSURE: 102 MMHG

## 2021-01-01 VITALS
BODY MASS INDEX: 18.96 KG/M2 | RESPIRATION RATE: 27 BRPM | WEIGHT: 140 LBS | HEIGHT: 72 IN | OXYGEN SATURATION: 75 % | HEART RATE: 102 BPM | SYSTOLIC BLOOD PRESSURE: 48 MMHG | DIASTOLIC BLOOD PRESSURE: 31 MMHG | TEMPERATURE: 97.2 F

## 2021-01-01 DIAGNOSIS — J90 PLEURAL EFFUSION: Primary | ICD-10-CM

## 2021-01-01 DIAGNOSIS — N13.30 HYDRONEPHROSIS: ICD-10-CM

## 2021-01-01 DIAGNOSIS — R06.02 SOB (SHORTNESS OF BREATH): ICD-10-CM

## 2021-01-01 LAB
ALBUMIN SERPL-MCNC: 1.9 G/DL (ref 3.5–5)
ALBUMIN SERPL-MCNC: 2 G/DL (ref 3.5–5)
ALBUMIN/GLOB SERPL: 0.6 {RATIO} (ref 1.1–2.2)
ALBUMIN/GLOB SERPL: 0.6 {RATIO} (ref 1.1–2.2)
ALP SERPL-CCNC: 461 U/L (ref 45–117)
ALP SERPL-CCNC: 558 U/L (ref 45–117)
ALT SERPL-CCNC: 41 U/L (ref 12–78)
ALT SERPL-CCNC: 46 U/L (ref 12–78)
ANION GAP SERPL CALC-SCNC: 9 MMOL/L (ref 5–15)
ANION GAP SERPL CALC-SCNC: 9 MMOL/L (ref 5–15)
APPEARANCE FLD: ABNORMAL
APPEARANCE UR: ABNORMAL
ARTERIAL PATENCY WRIST A: ABNORMAL
AST SERPL-CCNC: 101 U/L (ref 15–37)
AST SERPL-CCNC: 90 U/L (ref 15–37)
ATRIAL RATE: 118 BPM
ATRIAL RATE: 133 BPM
BASE DEFICIT BLD-SCNC: 2 MMOL/L
BASOPHILS # BLD: 0 K/UL (ref 0–0.1)
BASOPHILS # BLD: 0 K/UL (ref 0–0.1)
BASOPHILS NFR BLD: 0 % (ref 0–1)
BASOPHILS NFR BLD: 0 % (ref 0–1)
BDY SITE: ABNORMAL
BILIRUB SERPL-MCNC: 0.3 MG/DL (ref 0.2–1)
BILIRUB SERPL-MCNC: 0.4 MG/DL (ref 0.2–1)
BILIRUB UR QL: NEGATIVE
BNP SERPL-MCNC: 468 PG/ML
BODY FLD TYPE: NORMAL
BODY TEMPERATURE: 98.6
BUN SERPL-MCNC: 32 MG/DL (ref 6–20)
BUN SERPL-MCNC: 34 MG/DL (ref 6–20)
BUN/CREAT SERPL: 41 (ref 12–20)
BUN/CREAT SERPL: 41 (ref 12–20)
CA-I BLD-SCNC: 1.17 MMOL/L (ref 1.12–1.32)
CALCIUM SERPL-MCNC: 7.1 MG/DL (ref 8.5–10.1)
CALCIUM SERPL-MCNC: 7.8 MG/DL (ref 8.5–10.1)
CALCULATED R AXIS, ECG10: 29 DEGREES
CALCULATED R AXIS, ECG10: 55 DEGREES
CALCULATED T AXIS, ECG11: 52 DEGREES
CALCULATED T AXIS, ECG11: 64 DEGREES
CHLORIDE SERPL-SCNC: 108 MMOL/L (ref 97–108)
CHLORIDE SERPL-SCNC: 113 MMOL/L (ref 97–108)
CO2 SERPL-SCNC: 21 MMOL/L (ref 21–32)
CO2 SERPL-SCNC: 21 MMOL/L (ref 21–32)
COLOR FLD: ABNORMAL
COLOR UR: ABNORMAL
COMMENT, HOLDF: NORMAL
COMMENT, HOLDF: NORMAL
COVID-19 RAPID TEST, COVR: NOT DETECTED
CREAT SERPL-MCNC: 0.79 MG/DL (ref 0.7–1.3)
CREAT SERPL-MCNC: 0.83 MG/DL (ref 0.7–1.3)
DIAGNOSIS, 93000: NORMAL
DIAGNOSIS, 93000: NORMAL
DIFFERENTIAL METHOD BLD: ABNORMAL
DIFFERENTIAL METHOD BLD: ABNORMAL
EOSINOPHIL # BLD: 0 K/UL (ref 0–0.4)
EOSINOPHIL # BLD: 0 K/UL (ref 0–0.4)
EOSINOPHIL NFR BLD: 0 % (ref 0–7)
EOSINOPHIL NFR BLD: 0 % (ref 0–7)
ERYTHROCYTE [DISTWIDTH] IN BLOOD BY AUTOMATED COUNT: 20.6 % (ref 11.5–14.5)
ERYTHROCYTE [DISTWIDTH] IN BLOOD BY AUTOMATED COUNT: 20.7 % (ref 11.5–14.5)
FERRITIN SERPL-MCNC: 7178 NG/ML (ref 26–388)
FOLATE SERPL-MCNC: 6.2 NG/ML (ref 5–21)
GAS FLOW.O2 O2 DELIVERY SYS: ABNORMAL L/MIN
GLOBULIN SER CALC-MCNC: 3.1 G/DL (ref 2–4)
GLOBULIN SER CALC-MCNC: 3.6 G/DL (ref 2–4)
GLUCOSE FLD-MCNC: 74 MG/DL
GLUCOSE SERPL-MCNC: 74 MG/DL (ref 65–100)
GLUCOSE SERPL-MCNC: 82 MG/DL (ref 65–100)
GLUCOSE UR STRIP.AUTO-MCNC: NEGATIVE MG/DL
HCO3 BLD-SCNC: 22.6 MMOL/L (ref 22–26)
HCT VFR BLD AUTO: 20.8 % (ref 36.6–50.3)
HCT VFR BLD AUTO: 23.9 % (ref 36.6–50.3)
HGB BLD-MCNC: 6.6 G/DL (ref 12.1–17)
HGB BLD-MCNC: 7.6 G/DL (ref 12.1–17)
HGB UR QL STRIP: ABNORMAL
HISTORY CHECKED?,CKHIST: NORMAL
IMM GRANULOCYTES # BLD AUTO: 0 K/UL
IMM GRANULOCYTES # BLD AUTO: 0 K/UL
IMM GRANULOCYTES NFR BLD AUTO: 0 %
IMM GRANULOCYTES NFR BLD AUTO: 0 %
INR PPP: 1.6 (ref 0.9–1.1)
IRON SERPL-MCNC: 130 UG/DL (ref 35–150)
KETONES UR QL STRIP.AUTO: NEGATIVE MG/DL
LACTATE SERPL-SCNC: 3.4 MMOL/L (ref 0.4–2)
LACTATE SERPL-SCNC: 3.9 MMOL/L (ref 0.4–2)
LDH FLD L TO P-CCNC: 2062 U/L
LEUKOCYTE ESTERASE UR QL STRIP.AUTO: ABNORMAL
LIPASE SERPL-CCNC: 334 U/L (ref 73–393)
LYMPHOCYTES # BLD: 0.4 K/UL (ref 0.8–3.5)
LYMPHOCYTES # BLD: 2.2 K/UL (ref 0.8–3.5)
LYMPHOCYTES NFR BLD: 1 % (ref 12–49)
LYMPHOCYTES NFR BLD: 5 % (ref 12–49)
LYMPHOCYTES NFR FLD: 5 %
MAGNESIUM SERPL-MCNC: 1.6 MG/DL (ref 1.6–2.4)
MCH RBC QN AUTO: 28.9 PG (ref 26–34)
MCH RBC QN AUTO: 29.3 PG (ref 26–34)
MCHC RBC AUTO-ENTMCNC: 31.7 G/DL (ref 30–36.5)
MCHC RBC AUTO-ENTMCNC: 31.8 G/DL (ref 30–36.5)
MCV RBC AUTO: 91.2 FL (ref 80–99)
MCV RBC AUTO: 92.3 FL (ref 80–99)
MESOTHL CELL NFR FLD: 7 %
MONOCYTES # BLD: 0.4 K/UL (ref 0–1)
MONOCYTES # BLD: 1.3 K/UL (ref 0–1)
MONOCYTES NFR BLD: 1 % (ref 5–13)
MONOCYTES NFR BLD: 3 % (ref 5–13)
MONOS+MACROS NFR FLD: 5 %
NEUTROPHILS NFR FLD: 83 %
NEUTS BAND NFR BLD MANUAL: 4 % (ref 0–6)
NEUTS SEG # BLD: 37.3 K/UL (ref 1.8–8)
NEUTS SEG # BLD: 39.6 K/UL (ref 1.8–8)
NEUTS SEG NFR BLD: 92 % (ref 32–75)
NEUTS SEG NFR BLD: 94 % (ref 32–75)
NITRITE UR QL STRIP.AUTO: NEGATIVE
NRBC # BLD: 0.24 K/UL (ref 0–0.01)
NRBC # BLD: 0.31 K/UL (ref 0–0.01)
NRBC BLD-RTO: 0.6 PER 100 WBC
NRBC BLD-RTO: 0.7 PER 100 WBC
NUC CELL # FLD: 125 /CU MM
PCO2 BLD: 37.8 MMHG (ref 35–45)
PH BLD: 7.38 [PH] (ref 7.35–7.45)
PH FLD: 7 [PH]
PH UR STRIP: 5.5 [PH] (ref 5–8)
PHOSPHATE SERPL-MCNC: 4.5 MG/DL (ref 2.6–4.7)
PLATELET # BLD AUTO: 38 K/UL (ref 150–400)
PLATELET # BLD AUTO: 46 K/UL (ref 150–400)
PMV BLD AUTO: 10.9 FL (ref 8.9–12.9)
PMV BLD AUTO: 11.7 FL (ref 8.9–12.9)
PO2 BLD: 27 MMHG (ref 80–100)
POTASSIUM SERPL-SCNC: 4.2 MMOL/L (ref 3.5–5.1)
POTASSIUM SERPL-SCNC: 4.4 MMOL/L (ref 3.5–5.1)
PROT FLD-MCNC: 2 G/DL
PROT SERPL-MCNC: 5 G/DL (ref 6.4–8.2)
PROT SERPL-MCNC: 5.6 G/DL (ref 6.4–8.2)
PROT UR STRIP-MCNC: 100 MG/DL
PROTHROMBIN TIME: 16 SEC (ref 9–11.1)
Q-T INTERVAL, ECG07: 286 MS
Q-T INTERVAL, ECG07: 312 MS
QRS DURATION, ECG06: 62 MS
QRS DURATION, ECG06: 64 MS
QTC CALCULATION (BEZET), ECG08: 425 MS
QTC CALCULATION (BEZET), ECG08: 437 MS
RBC # BLD AUTO: 2.28 M/UL (ref 4.1–5.7)
RBC # BLD AUTO: 2.59 M/UL (ref 4.1–5.7)
RBC # FLD: >100 /CU MM
RBC MORPH BLD: ABNORMAL
SAMPLES BEING HELD,HOLD: NORMAL
SAMPLES BEING HELD,HOLD: NORMAL
SAO2 % BLD: 51 % (ref 92–97)
SODIUM SERPL-SCNC: 138 MMOL/L (ref 136–145)
SODIUM SERPL-SCNC: 143 MMOL/L (ref 136–145)
SOURCE, COVRS: NORMAL
SP GR UR REFRACTOMETRY: 1.03 (ref 1–1.03)
SPECIMEN SOURCE FLD: ABNORMAL
SPECIMEN SOURCE FLD: NORMAL
SPECIMEN SOURCE, FCOV2M: NORMAL
SPECIMEN TYPE, XMCV1T: NORMAL
SPECIMEN TYPE: ABNORMAL
TOTAL RESP. RATE, ITRR: 25
TRIGL FLD-MCNC: 133 MG/DL
TROPONIN I SERPL-MCNC: 0.07 NG/ML
TROPONIN I SERPL-MCNC: <0.05 NG/ML
TROPONIN I SERPL-MCNC: <0.05 NG/ML
TSH SERPL DL<=0.05 MIU/L-ACNC: 2.56 UIU/ML (ref 0.36–3.74)
UROBILINOGEN UR QL STRIP.AUTO: 0.2 EU/DL (ref 0.2–1)
VENTRICULAR RATE, ECG03: 118 BPM
VENTRICULAR RATE, ECG03: 133 BPM
VIT B12 SERPL-MCNC: >2000 PG/ML (ref 193–986)
WBC # BLD AUTO: 38.1 K/UL (ref 4.1–11.1)
WBC # BLD AUTO: 43.1 K/UL (ref 4.1–11.1)
WBC MORPH BLD: ABNORMAL

## 2021-01-01 PROCEDURE — 82607 VITAMIN B-12: CPT

## 2021-01-01 PROCEDURE — 83605 ASSAY OF LACTIC ACID: CPT

## 2021-01-01 PROCEDURE — 71045 X-RAY EXAM CHEST 1 VIEW: CPT

## 2021-01-01 PROCEDURE — 77010033678 HC OXYGEN DAILY

## 2021-01-01 PROCEDURE — 89050 BODY FLUID CELL COUNT: CPT

## 2021-01-01 PROCEDURE — 84443 ASSAY THYROID STIM HORMONE: CPT

## 2021-01-01 PROCEDURE — 74011000258 HC RX REV CODE- 258: Performed by: INTERNAL MEDICINE

## 2021-01-01 PROCEDURE — 83615 LACTATE (LD) (LDH) ENZYME: CPT

## 2021-01-01 PROCEDURE — 84484 ASSAY OF TROPONIN QUANT: CPT

## 2021-01-01 PROCEDURE — 88305 TISSUE EXAM BY PATHOLOGIST: CPT

## 2021-01-01 PROCEDURE — 93005 ELECTROCARDIOGRAM TRACING: CPT

## 2021-01-01 PROCEDURE — 82945 GLUCOSE OTHER FLUID: CPT

## 2021-01-01 PROCEDURE — 74011000250 HC RX REV CODE- 250: Performed by: PHYSICIAN ASSISTANT

## 2021-01-01 PROCEDURE — 85025 COMPLETE CBC W/AUTO DIFF WBC: CPT

## 2021-01-01 PROCEDURE — 82803 BLOOD GASES ANY COMBINATION: CPT

## 2021-01-01 PROCEDURE — 83880 ASSAY OF NATRIURETIC PEPTIDE: CPT

## 2021-01-01 PROCEDURE — 99285 EMERGENCY DEPT VISIT HI MDM: CPT

## 2021-01-01 PROCEDURE — 36415 COLL VENOUS BLD VENIPUNCTURE: CPT

## 2021-01-01 PROCEDURE — 84478 ASSAY OF TRIGLYCERIDES: CPT

## 2021-01-01 PROCEDURE — 74011250636 HC RX REV CODE- 250/636: Performed by: INTERNAL MEDICINE

## 2021-01-01 PROCEDURE — 87040 BLOOD CULTURE FOR BACTERIA: CPT

## 2021-01-01 PROCEDURE — 32551 INSERTION OF CHEST TUBE: CPT | Performed by: PHYSICIAN ASSISTANT

## 2021-01-01 PROCEDURE — 92950 HEART/LUNG RESUSCITATION CPR: CPT

## 2021-01-01 PROCEDURE — 74011000250 HC RX REV CODE- 250: Performed by: STUDENT IN AN ORGANIZED HEALTH CARE EDUCATION/TRAINING PROGRAM

## 2021-01-01 PROCEDURE — 99253 IP/OBS CNSLTJ NEW/EST LOW 45: CPT | Performed by: PHYSICIAN ASSISTANT

## 2021-01-01 PROCEDURE — 83986 ASSAY PH BODY FLUID NOS: CPT

## 2021-01-01 PROCEDURE — 83690 ASSAY OF LIPASE: CPT

## 2021-01-01 PROCEDURE — 0W9B30Z DRAINAGE OF LEFT PLEURAL CAVITY WITH DRAINAGE DEVICE, PERCUTANEOUS APPROACH: ICD-10-PCS | Performed by: THORACIC SURGERY (CARDIOTHORACIC VASCULAR SURGERY)

## 2021-01-01 PROCEDURE — 74011250636 HC RX REV CODE- 250/636: Performed by: STUDENT IN AN ORGANIZED HEALTH CARE EDUCATION/TRAINING PROGRAM

## 2021-01-01 PROCEDURE — 94762 N-INVAS EAR/PLS OXIMTRY CONT: CPT

## 2021-01-01 PROCEDURE — C1769 GUIDE WIRE: HCPCS

## 2021-01-01 PROCEDURE — 74011000636 HC RX REV CODE- 636: Performed by: RADIOLOGY

## 2021-01-01 PROCEDURE — 87015 SPECIMEN INFECT AGNT CONCNTJ: CPT

## 2021-01-01 PROCEDURE — 2709999900 HC NON-CHARGEABLE SUPPLY

## 2021-01-01 PROCEDURE — 87635 SARS-COV-2 COVID-19 AMP PRB: CPT

## 2021-01-01 PROCEDURE — 84100 ASSAY OF PHOSPHORUS: CPT

## 2021-01-01 PROCEDURE — 88112 CYTOPATH CELL ENHANCE TECH: CPT

## 2021-01-01 PROCEDURE — 71275 CT ANGIOGRAPHY CHEST: CPT

## 2021-01-01 PROCEDURE — 5A12012 PERFORMANCE OF CARDIAC OUTPUT, SINGLE, MANUAL: ICD-10-PCS | Performed by: INTERNAL MEDICINE

## 2021-01-01 PROCEDURE — 77030012318 HC CATH URET INT UTMD -B

## 2021-01-01 PROCEDURE — 83540 ASSAY OF IRON: CPT

## 2021-01-01 PROCEDURE — 81001 URINALYSIS AUTO W/SCOPE: CPT

## 2021-01-01 PROCEDURE — 80053 COMPREHEN METABOLIC PANEL: CPT

## 2021-01-01 PROCEDURE — 87070 CULTURE OTHR SPECIMN AEROBIC: CPT

## 2021-01-01 PROCEDURE — 74011250636 HC RX REV CODE- 250/636: Performed by: PHYSICIAN ASSISTANT

## 2021-01-01 PROCEDURE — 50435 EXCHANGE NEPHROSTOMY CATH: CPT

## 2021-01-01 PROCEDURE — 88341 IMHCHEM/IMCYTCHM EA ADD ANTB: CPT

## 2021-01-01 PROCEDURE — 88342 IMHCHEM/IMCYTCHM 1ST ANTB: CPT

## 2021-01-01 PROCEDURE — P9045 ALBUMIN (HUMAN), 5%, 250 ML: HCPCS | Performed by: PHYSICIAN ASSISTANT

## 2021-01-01 PROCEDURE — 0W9930Z DRAINAGE OF RIGHT PLEURAL CAVITY WITH DRAINAGE DEVICE, PERCUTANEOUS APPROACH: ICD-10-PCS | Performed by: THORACIC SURGERY (CARDIOTHORACIC VASCULAR SURGERY)

## 2021-01-01 PROCEDURE — 77030010548 HC BG WND DRN ARMD -B

## 2021-01-01 PROCEDURE — 0BH17EZ INSERTION OF ENDOTRACHEAL AIRWAY INTO TRACHEA, VIA NATURAL OR ARTIFICIAL OPENING: ICD-10-PCS | Performed by: INTERNAL MEDICINE

## 2021-01-01 PROCEDURE — 87116 MYCOBACTERIA CULTURE: CPT

## 2021-01-01 PROCEDURE — 5A1935Z RESPIRATORY VENTILATION, LESS THAN 24 CONSECUTIVE HOURS: ICD-10-PCS | Performed by: INTERNAL MEDICINE

## 2021-01-01 PROCEDURE — 83735 ASSAY OF MAGNESIUM: CPT

## 2021-01-01 PROCEDURE — 84157 ASSAY OF PROTEIN OTHER: CPT

## 2021-01-01 PROCEDURE — C2617 STENT, NON-COR, TEM W/O DEL: HCPCS

## 2021-01-01 PROCEDURE — 94002 VENT MGMT INPAT INIT DAY: CPT

## 2021-01-01 PROCEDURE — 77030002996 HC SUT SLK J&J -A

## 2021-01-01 PROCEDURE — 74011000636 HC RX REV CODE- 636: Performed by: STUDENT IN AN ORGANIZED HEALTH CARE EDUCATION/TRAINING PROGRAM

## 2021-01-01 PROCEDURE — 65660000001 HC RM ICU INTERMED STEPDOWN

## 2021-01-01 PROCEDURE — 85610 PROTHROMBIN TIME: CPT

## 2021-01-01 PROCEDURE — 82728 ASSAY OF FERRITIN: CPT

## 2021-01-01 PROCEDURE — 82746 ASSAY OF FOLIC ACID SERUM: CPT

## 2021-01-01 PROCEDURE — 74011250636 HC RX REV CODE- 250/636: Performed by: EMERGENCY MEDICINE

## 2021-01-01 RX ORDER — FENTANYL CITRATE 50 UG/ML
200 INJECTION, SOLUTION INTRAMUSCULAR; INTRAVENOUS
Status: DISCONTINUED | OUTPATIENT
Start: 2021-01-01 | End: 2021-01-01

## 2021-01-01 RX ORDER — SODIUM CHLORIDE 9 MG/ML
250 INJECTION, SOLUTION INTRAVENOUS AS NEEDED
Status: DISCONTINUED | OUTPATIENT
Start: 2021-01-01 | End: 2021-01-01 | Stop reason: HOSPADM

## 2021-01-01 RX ORDER — SODIUM CHLORIDE 0.9 % (FLUSH) 0.9 %
5-40 SYRINGE (ML) INJECTION AS NEEDED
Status: DISCONTINUED | OUTPATIENT
Start: 2021-01-01 | End: 2021-01-01 | Stop reason: HOSPADM

## 2021-01-01 RX ORDER — TAMSULOSIN HYDROCHLORIDE 0.4 MG/1
0.4 CAPSULE ORAL DAILY
Status: DISCONTINUED | OUTPATIENT
Start: 2021-01-01 | End: 2021-01-01 | Stop reason: HOSPADM

## 2021-01-01 RX ORDER — HEPARIN SODIUM 5000 [USP'U]/ML
5000 INJECTION, SOLUTION INTRAVENOUS; SUBCUTANEOUS EVERY 8 HOURS
Status: DISCONTINUED | OUTPATIENT
Start: 2021-01-01 | End: 2021-01-01

## 2021-01-01 RX ORDER — FENTANYL CITRATE 50 UG/ML
50 INJECTION, SOLUTION INTRAMUSCULAR; INTRAVENOUS ONCE
Status: COMPLETED | OUTPATIENT
Start: 2021-01-01 | End: 2021-01-01

## 2021-01-01 RX ORDER — POLYETHYLENE GLYCOL 3350 17 G/17G
17 POWDER, FOR SOLUTION ORAL DAILY PRN
Status: DISCONTINUED | OUTPATIENT
Start: 2021-01-01 | End: 2021-01-01 | Stop reason: HOSPADM

## 2021-01-01 RX ORDER — SODIUM CHLORIDE 0.9 % (FLUSH) 0.9 %
5-40 SYRINGE (ML) INJECTION EVERY 8 HOURS
Status: DISCONTINUED | OUTPATIENT
Start: 2021-01-01 | End: 2021-01-01 | Stop reason: HOSPADM

## 2021-01-01 RX ORDER — SODIUM CHLORIDE 9 MG/ML
500 INJECTION, SOLUTION INTRAVENOUS CONTINUOUS
Status: DISCONTINUED | OUTPATIENT
Start: 2021-01-01 | End: 2021-01-01

## 2021-01-01 RX ORDER — SODIUM CHLORIDE, SODIUM LACTATE, POTASSIUM CHLORIDE, CALCIUM CHLORIDE 600; 310; 30; 20 MG/100ML; MG/100ML; MG/100ML; MG/100ML
125 INJECTION, SOLUTION INTRAVENOUS CONTINUOUS
Status: DISCONTINUED | OUTPATIENT
Start: 2021-01-01 | End: 2021-01-01 | Stop reason: HOSPADM

## 2021-01-01 RX ORDER — LIDOCAINE HYDROCHLORIDE 10 MG/ML
40 INJECTION INFILTRATION; PERINEURAL ONCE
Status: COMPLETED | OUTPATIENT
Start: 2021-01-01 | End: 2021-01-01

## 2021-01-01 RX ORDER — HEPARIN 100 UNIT/ML
300 SYRINGE INTRAVENOUS AS NEEDED
Status: DISCONTINUED | OUTPATIENT
Start: 2021-01-01 | End: 2021-01-01 | Stop reason: HOSPADM

## 2021-01-01 RX ORDER — ONDANSETRON 2 MG/ML
4 INJECTION INTRAMUSCULAR; INTRAVENOUS
Status: DISCONTINUED | OUTPATIENT
Start: 2021-01-01 | End: 2021-01-01 | Stop reason: HOSPADM

## 2021-01-01 RX ORDER — MIDAZOLAM HYDROCHLORIDE 1 MG/ML
5 INJECTION, SOLUTION INTRAMUSCULAR; INTRAVENOUS
Status: DISCONTINUED | OUTPATIENT
Start: 2021-01-01 | End: 2021-01-01

## 2021-01-01 RX ORDER — ACETAMINOPHEN 650 MG/1
650 SUPPOSITORY RECTAL
Status: DISCONTINUED | OUTPATIENT
Start: 2021-01-01 | End: 2021-01-01 | Stop reason: HOSPADM

## 2021-01-01 RX ORDER — SODIUM CHLORIDE 0.9 % (FLUSH) 0.9 %
5-10 SYRINGE (ML) INJECTION AS NEEDED
Status: DISCONTINUED | OUTPATIENT
Start: 2021-01-01 | End: 2021-01-01 | Stop reason: HOSPADM

## 2021-01-01 RX ORDER — VANCOMYCIN/0.9 % SOD CHLORIDE 1.5G/250ML
1500 PLASTIC BAG, INJECTION (ML) INTRAVENOUS ONCE
Status: COMPLETED | OUTPATIENT
Start: 2021-01-01 | End: 2021-01-01

## 2021-01-01 RX ORDER — ACETAMINOPHEN 325 MG/1
650 TABLET ORAL
Status: DISCONTINUED | OUTPATIENT
Start: 2021-01-01 | End: 2021-01-01 | Stop reason: HOSPADM

## 2021-01-01 RX ORDER — PROMETHAZINE HYDROCHLORIDE 25 MG/1
12.5 TABLET ORAL
Status: DISCONTINUED | OUTPATIENT
Start: 2021-01-01 | End: 2021-01-01 | Stop reason: HOSPADM

## 2021-01-01 RX ORDER — LIDOCAINE HYDROCHLORIDE 20 MG/ML
20 INJECTION, SOLUTION INFILTRATION; PERINEURAL
Status: COMPLETED | OUTPATIENT
Start: 2021-01-01 | End: 2021-01-01

## 2021-01-01 RX ORDER — ALBUMIN HUMAN 50 G/1000ML
12.5 SOLUTION INTRAVENOUS ONCE
Status: COMPLETED | OUTPATIENT
Start: 2021-01-01 | End: 2021-01-01

## 2021-01-01 RX ADMIN — Medication 10 ML: at 01:02

## 2021-01-01 RX ADMIN — Medication 300 UNITS: at 10:27

## 2021-01-01 RX ADMIN — LIDOCAINE HYDROCHLORIDE 40 ML: 10 INJECTION, SOLUTION INFILTRATION; PERINEURAL at 09:02

## 2021-01-01 RX ADMIN — PIPERACILLIN AND TAZOBACTAM 3.38 G: 3; .375 INJECTION, POWDER, LYOPHILIZED, FOR SOLUTION INTRAVENOUS at 23:36

## 2021-01-01 RX ADMIN — FENTANYL CITRATE 50 MCG: 50 INJECTION, SOLUTION INTRAMUSCULAR; INTRAVENOUS at 09:02

## 2021-01-01 RX ADMIN — PIPERACILLIN AND TAZOBACTAM 3.38 G: 3; .375 INJECTION, POWDER, LYOPHILIZED, FOR SOLUTION INTRAVENOUS at 07:26

## 2021-01-01 RX ADMIN — SODIUM CHLORIDE 500 ML: 900 INJECTION, SOLUTION INTRAVENOUS at 09:40

## 2021-01-01 RX ADMIN — SODIUM CHLORIDE 1000 ML: 9 INJECTION, SOLUTION INTRAVENOUS at 23:36

## 2021-01-01 RX ADMIN — LIDOCAINE HYDROCHLORIDE 200 MG: 20 INJECTION, SOLUTION INFILTRATION; PERINEURAL at 09:52

## 2021-01-01 RX ADMIN — ALBUMIN (HUMAN) 12.5 G: 12.5 INJECTION, SOLUTION INTRAVENOUS at 10:28

## 2021-01-01 RX ADMIN — VANCOMYCIN HYDROCHLORIDE 1500 MG: 10 INJECTION, POWDER, LYOPHILIZED, FOR SOLUTION INTRAVENOUS at 01:01

## 2021-01-01 RX ADMIN — SODIUM CHLORIDE 1000 ML: 9 INJECTION, SOLUTION INTRAVENOUS at 15:00

## 2021-01-01 RX ADMIN — SODIUM CHLORIDE, POTASSIUM CHLORIDE, SODIUM LACTATE AND CALCIUM CHLORIDE 125 ML/HR: 600; 310; 30; 20 INJECTION, SOLUTION INTRAVENOUS at 01:01

## 2021-01-01 RX ADMIN — IOPAMIDOL 100 ML: 612 INJECTION, SOLUTION INTRAVENOUS at 09:53

## 2021-01-01 RX ADMIN — Medication 10 ML: at 07:07

## 2021-01-01 RX ADMIN — IOPAMIDOL 80 ML: 755 INJECTION, SOLUTION INTRAVENOUS at 14:46

## 2021-01-01 RX ADMIN — FENTANYL CITRATE 50 MCG: 50 INJECTION, SOLUTION INTRAMUSCULAR; INTRAVENOUS at 09:45

## 2021-01-01 RX ADMIN — SODIUM CHLORIDE 1000 ML: 9 INJECTION, SOLUTION INTRAVENOUS at 17:11

## 2021-01-04 NOTE — DISCHARGE INSTRUCTIONS
[address]  Department of Interventional Radiology  [facility]  [phone]    Nephroureteral Tube Discharge Instruction    Home Care Instructions: You can resume your regular diet. Do not lift anything heavier than a gallon of milk or do anything strenuous for the next 24 hours. It is not required that you keep the drainage bags connected, but the tubes should be flushed with the saline daily to keep them open. With the bags off, you should be able to urinate normally. We are sending you home with extra drainage bags just in case they need to be reconnected again. Call If:   You should call your Physician if you have any bleeding other than a small spot on your bandage. Call if you have any signs of infection, fever, or increased pain at the site of the tube. Call if you should have leakage around the tube, a change in the appearance of the urine draining from the tube, increased pain in the lower back, or no urination  for 12 hours. Follow-Up Instructions:  Please see your ordering doctor as he/she has requested. To Reach Us:  Side effects of sedation medications and other medications used today have been reviewed. Notify us of nausea, itching, hives, dizziness, or anything else out of the ordinary. Should you experience any of these significant changes, please call [phone] between the hours of 7:30 am and 10 pm or [phone] after hours.  After hours, ask the  to page the [address] Technologist, and describe the problem to the technologist.     Patient Signature:  Date: 1/4/2021  Discharging Nurse: Issa Boswell RN

## 2021-01-04 NOTE — PROGRESS NOTES
Pt arrives via wheelchair to angio department accompanied by family for left nephroureteral tube change procedure. All assessments completed and consent was reviewed. Education given was regarding procedure,post-procedure care and  management/follow-up. Opportunity for questions was provided and all questions and concerns were addressed.

## 2021-01-07 PROBLEM — J90 BILATERAL PLEURAL EFFUSION: Status: ACTIVE | Noted: 2021-01-01

## 2021-01-07 NOTE — ED PROVIDER NOTES
The history is provided by the patient. Shortness of Breath The average episode lasts 2 weeks. The problem occurs continuously. The problem has been gradually worsening. Associated symptoms include chest pain. Pertinent negatives include no fever, no cough, no vomiting, no abdominal pain and no leg swelling. It is unknown what precipitated the problem. Risk factors: recent hospitalization, active CA. He has tried nothing for the symptoms. The treatment provided no relief. He has had prior hospitalizations. Associated medical issues do not include pneumonia, PE, heart failure or DVT. Past Medical History:  
Diagnosis Date  Cancer ([facility] [address] Past Surgical History:  
Procedure Laterality Date  IR CHANGE EXTRNL NEPHROURETERAL CATH SI  2020  IR CHANGE NEPHROSTOMY  PYELOS TUBE DELLA  2021 Family History:  
Problem Relation Age of Onset  Breast Cancer Mother  Diabetes Mother  Cancer Mother   
     breast  
 Heart Disease Father  Hypertension Father  Stroke Father Social History Socioeconomic History  Marital status:  Spouse name: Not on file  Number of children: Not on file  Years of education: Not on file  Highest education level: Not on file Occupational History  Not on file Social Needs  Financial resource strain: Not on file  Food insecurity Worry: Not on file Inability: Not on file  Transportation needs Medical: Not on file Non-medical: Not on file Tobacco Use  Smoking status: Former Smoker Years: 20.00 Types: Cigars Quit date: 2020 Years since quittin.0  Smokeless tobacco: Never Used Substance and Sexual Activity  Alcohol use: Yes Alcohol/week: 5.0 standard drinks Types: 5 Standard drinks or equivalent per week Comment: weekends  Drug use: Yes Types: Marijuana  Sexual activity: Yes  
  Partners: Female Lifestyle  Physical activity Days per week: Not on file Minutes per session: Not on file  Stress: Not on file Relationships  Social connections Talks on phone: Not on file Gets together: Not on file Attends Orthodox service: Not on file Active member of club or organization: Not on file Attends meetings of clubs or organizations: Not on file Relationship status: Not on file  Intimate partner violence Fear of current or ex partner: Not on file Emotionally abused: Not on file Physically abused: Not on file Forced sexual activity: Not on file Other Topics Concern  Not on file Social History Narrative  Not on file ALLERGIES: Patient has no known allergies. Review of Systems Constitutional: Negative for chills and fever. Respiratory: Positive for shortness of breath. Negative for cough. Cardiovascular: Positive for chest pain. Negative for leg swelling. Gastrointestinal: Negative for abdominal pain, constipation, diarrhea and vomiting. Neurological: Negative for dizziness and light-headedness. All other systems reviewed and are negative. Vitals:  
 01/07/21 1435 BP: 118/84 Resp: 30 SpO2: 99% Weight: 63.5 kg (140 lb) Height: 6' (1.829 m) Physical Exam 
Vitals signs and nursing note reviewed. Constitutional:   
   General: He is in acute distress. Appearance: He is well-developed. He is cachectic. He is ill-appearing and toxic-appearing. HENT:  
   Head: Normocephalic and atraumatic. Eyes:  
   Conjunctiva/sclera: Conjunctivae normal.  
   Pupils: Pupils are equal, round, and reactive to light. Neck: Musculoskeletal: Normal range of motion. Cardiovascular:  
   Rate and Rhythm: Regular rhythm. Tachycardia present. Pulmonary:  
   Effort: Tachypnea, accessory muscle usage and respiratory distress present. Breath sounds: Normal breath sounds. Abdominal:  
   General: There is no distension. Palpations: Abdomen is soft. Tenderness: There is no abdominal tenderness. Musculoskeletal: Normal range of motion. Right lower leg: No edema. Left lower leg: No edema. Skin: 
   General: Skin is warm and dry. Capillary Refill: Capillary refill takes less than 2 seconds. Neurological:  
   General: No focal deficit present. Mental Status: He is alert and oriented to person, place, and time. Psychiatric:     
   Mood and Affect: Mood is anxious. Behavior: Behavior normal.  
 
  
 
MDM Number of Diagnoses or Management Options Pleural effusion: new and requires workup SOB (shortness of breath): new and requires workup Procedures EKG at 2:50 PM 
Narrow complex tachycardia, 133 bpm, regular rhythm, concerning for SVT, normal axis, no STEMI 
EKG interpreted by Seamus James MD  
  
 
  
77-year-old with malignant squamous cell cancer presents with shortness of breath that has been worsening over the course of the last 2 weeks. CTA is negative for pulmonary embolism, pneumonia, ARDS, pneumothorax. Imaging shows large bilateral pleural effusions, likely malignant. Patient's oxygen saturations are 97% on room air, he is tachypneic and tachycardic. He will be admitted to the hospital for drainage of his effusions. Perfect Serve Consult for Admission 7:30 PM 
 
ED Room Number: ER25/25 Patient Name and age:  Manuel Oliverosd. 39 y.o.  male Working Diagnosis: 1. Pleural effusion 2. SOB (shortness of breath) COVID-19 Suspicion:  no 
Sepsis present:  no  Reassessment needed: no 
Code Status:  Full Code Readmission: no 
Isolation Requirements:  no 
Recommended Level of Care:  telemetry Department:[facility] Adult ED - [phone] Other:  Squamous cell cancer, likely malignant effusion

## 2021-01-07 NOTE — ED TRIAGE NOTES
Patient arrives for increased shortness of breath over the last week. Denies any recent COVID contacts. Patient reports increased generalized weakness. Reports chest pain. Hx of CA.  EMS reports 84% on RA up on arrival.

## 2021-01-08 NOTE — PROCEDURES
[address] [facility] [address],  [address] [place], [address] 
[phone] 
____________________________________________________________ Operative Report Procedure: Chest Tube Thoracostomy (CPT Code: 12479) Provider: MARTI Christopher Preoperative Diagnosis / Indication: Left pleural effusion Postoperative Diagnosis: same Medications: 15 cc's of 1% lidocaine EBL: minimal 
 
Technique: After Informed consent was obtained the patients left chest was prepped and draped in a sterile fashion. Time out was performed and all radiographs reviewed. 50 ml fentanyl IV administered. 15 ml 1% lidocaine infiltrated as local.  Needle advanced into left chest at 6th interspace. Yellow fluid drawn back into syringe. Guide wire advanced, then a small incision made in skin. Obturator passed over guide wire. An 8.5Fr chest tube was placed successfully. Sutured in place, covered with gauzeand bandaged. Chest tube attached to wall suction and large amount of yellow fluid removed from left chest. 
Pleural fluid labs ordered Patient tolerated the procedure well. No immediate complications. Post-placement CXR pending. Maximino Christopher 
1/8/2021

## 2021-01-08 NOTE — H&P
[address] HISTORY AND PHYSICAL Name:  Oneil Krueger 
MR#:  [id number] :  1975 ACCOUNT #:  [de-identified] ADMIT DATE:  2021 The patient was seen, evaluated and admitted by me on 2021. PRIMARY CARE PHYSICIAN:  Malia Giron MD 
 
SOURCE OF INFORMATION:  The patient, review of ED and old medical records. CHIEF COMPLAINT:  Shortness of breath. 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 The shortness of breath is worse with mild exertion such as walking. The patient has also noticed generalized weakness, which is also progressive and getting worse. EMS was called for him at home. When the EMS arrived at the scene, the patient's oxygen saturation was reported at 84% on room air. He was brought to the emergency room for further evaluation. When the patient arrived at the emergency room, CTA of the chest was negative for pulmonary embolism, but shows large pleural effusion with interval increase as well as bone and hepatic metastasis. The patient also has significant leukocytosis. He was referred to the hospitalist service for evaluation for admission. He was last admitted to this hospital from 2020 to 2020. The patient was admitted and treated for sepsis and replacement of bilateral nephrostomy tubes during that hospitalization. He was seen by the oncologist during that hospitalization. 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 The patient denies sick contact or contact with any person with COVID-19 virus infection. PAST MEDICAL HISTORY:  Metastatic squamous cell carcinoma of the head and neck, status post bilateral nephrostomy tube placement. ALLERGIES:  NO KNOWN DRUG ALLERGIES. MEDICATIONS:  Multivitamin 1 tablet daily, Zofran 8 mg every 8 hours as needed for nausea, Phenergan 10 mg 4 times daily as needed for nausea, Flomax 0.4 mg daily. FAMILY HISTORY:  This was reviewed. His mother had breast cancer, diabetes. His father had stroke, hypertension, and heart disease. PAST SURGICAL HISTORY:  This is significant for bilateral nephrostomy tube placement as well as PEG tube placement. SOCIAL HISTORY:  The patient is a former smoker, quit tobacco abuse in 01/2020. Admits to social consumption of alcohol. REVIEW OF SYSTEMS: 
HEAD, EYES, EARS, NOSE AND THROAT:  No headache, no dizziness, no blurring of vision, no photophobia. RESPIRATORY SYSTEM:  This is positive for shortness of breath. No cough, no hemoptysis. CARDIOVASCULAR SYSTEM:  This is positive for chest pain. No orthopnea, no palpitation. GASTROINTESTINAL SYSTEM:  No nausea or vomiting, no diarrhea, no constipation. GENITOURINARY SYSTEM:  No dysuria, no urgency, and no frequency. All other systems are reviewed and they are negative. PHYSICAL EXAMINATION: 
GENERAL APPEARANCE:  The patient appeared ill, in moderate distress. VITAL SIGNS:  On arrival at the emergency room, temperature 97.4, pulse 129, respiratory rate 30, blood pressure 188/84, oxygen saturation 99% on 4 L of oxygen. HEENT:  Head:  Normocephalic, atraumatic. Eyes:  Normal eye movement. No redness, no drainage, no discharge. Ears:  Normal external ears with no evidence of drainage. Nose:  No deformity and no drainage. Mouth and Throat:  No visible oral lesion. Dry oral mucosa. NECK:  Neck is supple. No JVD, no thyromegaly. CHEST:  Reduced breath sounds bilaterally and few bilateral basilar crackles. HEART:  Normal S1 and S2, regular. No clinically appreciable murmur. ABDOMEN:  Soft, nontender. Intact PEG tube and nephrostomy tube noted. EXTREMITIES:  No edema. Pulses 2+ bilaterally. MUSCULOSKELETAL SYSTEM:  No evidence of joint deformity or swelling. SKIN:  Hyperpigmentation, right side of the neck, due to radiation noted and present on admission. PSYCHIATRY:  Normal mood and affect. LYMPHATIC SYSTEM:  No cervical lymphadenopathy. DIAGNOSTIC DATA:  EKG shows sinus tachycardia, no significant ST or T-wave abnormalities. CTA of the chest, no pulmonary embolus, large pleural effusion with interval increase, osseous and hepatic metastasis. LABORATORY DATA:  Coagulation profile:  INR 1.6, PT 16.0. Cardiac profile:  Troponin less than 0.05. Chemistry:  Sodium 138, potassium 4.4, chloride 108, CO2 21, glucose 82, BUN 37, creatinine 0.83, calcium 7.4, total bilirubin 0.3, ALT 46, , alkaline phosphatase 558, total protein 5.6, albumin level 2.0, globulin 3.6. Hematology:  WBC 43.1, hemoglobin 7.6, hematocrit 23.9, platelets 46. Lactic acid level 3.9. Urinalysis: This is significant for negative nitrite, moderate leukocyte esterase, large blood. ASSESSMENT: 
1.  Bilateral pleural effusion. 2.  Metastatic squamous cell carcinoma of the head and neck. 3.  Anemia. 4.  Thrombocytopenia. 5.  Status post bilateral nephrostomy tube placement. 6.  Sepsis. 7.  Acute cystitis with hematuria. PLAN: 
1. Bilateral pleural effusions. We will admit the patient for further evaluation and treatment. This is the most likely cause of the patient's shortness of breath. The bilateral pleural effusion is most likely malignant pleural effusion. Thoracic Surgery consult will be requested to assist in further evaluation and treatment. 2.  Metastatic squamous cell carcinoma of the head and neck. The patient's oncologist will be consulted for continuation of care. The patient's prognosis is very poor. Palliative Care consult will be requested to address treatment goal. 
3.  Anemia. This is most likely due to the patient's metastatic cancer. We will carry out anemia workup including checking a stool guaiac to rule out occult GI bleed. 4.  Thrombocytopenia. The patient is asymptomatic most likely due to cancer. We will monitor the patient's platelet count. We will avoid heparin products. 5.  Status post bilateral nephrostomy tube. This is intact and functioning. 6.  Sepsis. The patient presented with significant leukocytosis, elevated lactic acid level and tachycardia. We will start the patient on vancomycin and Zosyn. We will check blood culture. The sepsis could be coming from the acute cystitis with hematuria, which will be discussed below. We will check a CT scan of the abdomen and pelvis to evaluate the patient for other possible sources of infection. 7.  Acute cystitis with hematuria. As stated above, this is a potential source of infection, especially since the patient has indwelling catheter. The patient will be started on antibiotics as stated above. We will await the results of CT scan of the abdomen and pelvis. OTHER ISSUES:  Code status: The patient is a full code. We will request SCD for DVT prophylaxis. SEPSIS REASSESSMENT COMPLETED:  The patient has improved capillary refill, improved cardiopulmonary examination and moist mucous membrane. FUNCTIONAL STATUS PRIOR TO ADMISSION:  The patient came from home. The patient is ambulatory with no assistant or device. COVID PRECAUTION:  The patient was wearing a face mask. I was wearing a cap, goggle, gown, gloves, and N95 face mask for this patient's encounter. The patient will be tested for COVID-19 virus infection.  
 
 
 
Love Fox MD 
 
 
 [id number] 
D:  01/08/2021 4:31 T:  01/08/2021 5:57 JOB #:  [id number] CC:  Alisa Patel MD

## 2021-01-08 NOTE — PROGRESS NOTES
Spiritual Care Assessment/Progress Note [address] 
 
 
NAME: Lacie Saxena MRN: [id number] AGE: 39 y.o. SEX: male Rastafari Affiliation: No preference Language: [place] 1/8/2021     Total Time (in minutes): 12 Spiritual Assessment begun in [place] [address] [facility] [address] DEP through conversation with: 
  
    [x]Patient        [] Family    [] Friend(s) Reason for Consult: Code Blue/99 Spiritual beliefs: (Please include comment if needed) 
   [] Identifies with a magdy tradition:     
   [] Supported by a magdy community:        
   [] Claims no spiritual orientation:       
   [] Seeking spiritual identity:            
   [] Adheres to an individual form of spirituality:       
   [x] Not able to assess:                   
 
    
Identified resources for coping:  
   [] Prayer                           
   [] Music                  [] Guided Imagery 
   [] Family/friends                 [] Pet visits [] Devotional reading                         [x] Unknown 
   [] Other:                                          
 
 
Interventions offered during this visit: (See comments for more details) Plan of Care: 
 
 [x] Support spiritual and/or cultural needs  
 [] Support AMD and/or advance care planning process    
 [] Support grieving process 
 [] Coordinate Rites and/or Rituals  
 [] Coordination with community clergy [] No spiritual needs identified at this time 
 [] Detailed Plan of Care below (See Comments)  [] Make referral to Music Therapy 
[] Make referral to Pet Therapy    
[] Make referral to Addiction services 
[] Make referral to [facility] 
[] Make referral to Spiritual Care Partner 
[] No future visits requested       
[x] Follow up upon further referrals Comments:  responded to RRT which turned Code Blue. Physician contacted pt's spouse. Pt is being attended to and no family present at this time. Please contact [phone] [facility] for further support. [address] [place], MACE 
 [phone])

## 2021-01-08 NOTE — CONSULTS
Thoracic Surgery Consultation Admit Date: 1/7/2021 Reason for Consultation: Bilateral Pleural effusions HPI: 
Nica Low is a 39 y.o. 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 Patient states that he has been experienveing progressive shortness of breath x 1.5 weeks, associated with dyspnea with mild exertion such as walking. The patient has also noticed generalized weakness, which is also progressive and getting worse. EMS was called for him at home. When the EMS arrived at the scene, the patient's oxygen saturation was reported at 84% on room air. CTA of the chest was negative for pulmonary embolism, but shows large pleural effusion with interval increase as well as bone and hepatic metastasis. The patient also has significant leukocytosis. He was referred to the hospitalist service for evaluation for admission. He was last admitted to this hospital from 12/22/2020 to 12/28/2020. The patient was admitted and treated for sepsis and replacement of bilateral nephrostomy tubes during that hospitalization. He was seen by the oncologist during that hospitalization. 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 The patient denies sick contact or contact with any person with COVID-19 virus infection. CTA Chest (1/7/2021) shows: 
IMPRESSION:  
1. No Pulmonary Embolus. 2. Large pleural effusions with interval increase. 3. Osseous and hepatic metastases. Patient Active Problem List  
 Diagnosis Date Noted  Bilateral pleural effusion 01/07/2021  Sepsis ([address] 12/22/2020  Urinary retention 09/28/2017 Past Medical History:  
Diagnosis Date  Cancer ([address] Past Surgical History:  
Procedure Laterality Date  IR CHANGE EXTRNL NEPHROURETERAL CATH SI  2020  IR CHANGE NEPHROSTOMY  PYELOS TUBE DELLA  2021 Social History Tobacco Use  Smoking status: Former Smoker Years: 20.00 Types: Cigars Quit date: 2020 Years since quittin.0  Smokeless tobacco: Never Used Substance Use Topics  Alcohol use: Yes Alcohol/week: 5.0 standard drinks Types: 5 Standard drinks or equivalent per week Comment: weekends Family History Problem Relation Age of Onset  Breast Cancer Mother  Diabetes Mother  Cancer Mother   
     breast  
 Heart Disease Father  Hypertension Father  Stroke Father Prior to Admission medications Medication Sig Start Date End Date Taking? Authorizing Provider  
tamsulosin (Flomax) 0.4 mg capsule Take 1 Cap by mouth daily. 20   [place], Francisco Arrington MD  
promethazine (PHENERGAN) 6.25 mg/5 mL syrup Take 10 mg by mouth four (4) times daily as needed for Nausea. Provider, Historical  
multivitamin (ONE A DAY) tablet Take 1 Tab by mouth daily. Provider, Historical  
ondansetron (ZOFRAN ODT) 8 mg disintegrating tablet Take 1 Tab by mouth every eight (8) hours as needed for Nausea. 18   Chinmay Pace MD  
 
No Known Allergies Subjective:  
 
Review of Systems: A comprehensive review of systems was negative except for that written in the History of Present Illness. Objective:  
 
Blood pressure 100/70, pulse (!) 117, temperature 97.2 °F (36.2 °C), resp. rate (!) 31, height 6' (1.829 m), weight 140 lb (63.5 kg), SpO2 94 %. Recent Results (from the past 24 hour(s)) SAMPLES BEING HELD Collection Time: 21  2:47 PM  
Result Value Ref Range SAMPLES BEING HELD 1RED, 1SILVER BC   
 COMMENT Add-on orders for these samples will be processed based on acceptable specimen integrity and analyte stability, which may vary by analyte.   
CBC WITH AUTOMATED DIFF  
 Collection Time: 01/07/21  2:47 PM  
Result Value Ref Range WBC 43.1 (H) 4.1 - 11.1 K/uL  
 RBC 2.59 (L) 4.10 - 5.70 M/uL HGB 7.6 (L) 12.1 - 17.0 g/dL HCT 23.9 (L) 36.6 - 50.3 % MCV 92.3 80.0 - 99.0 FL  
 MCH 29.3 26.0 - 34.0 PG  
 MCHC 31.8 30.0 - 36.5 g/dL RDW 20.6 (H) 11.5 - 14.5 % PLATELET 46 (LL) 885 - 400 K/uL MPV 11.7 8.9 - 12.9 FL  
 NRBC 0.7 (H) 0  WBC ABSOLUTE NRBC 0.31 (H) 0.00 - 0.01 K/uL NEUTROPHILS 92 (H) 32 - 75 % LYMPHOCYTES 5 (L) 12 - 49 % MONOCYTES 3 (L) 5 - 13 % EOSINOPHILS 0 0 - 7 % BASOPHILS 0 0 - 1 % IMMATURE GRANULOCYTES 0 %  
 ABS. NEUTROPHILS 39.6 (H) 1.8 - 8.0 K/UL  
 ABS. LYMPHOCYTES 2.2 0.8 - 3.5 K/UL  
 ABS. MONOCYTES 1.3 (H) 0.0 - 1.0 K/UL  
 ABS. EOSINOPHILS 0.0 0.0 - 0.4 K/UL  
 ABS. BASOPHILS 0.0 0.0 - 0.1 K/UL  
 ABS. IMM. GRANS. 0.0 K/UL  
 DF MANUAL    
 RBC COMMENTS ANISOCYTOSIS 2+ 
    
 RBC COMMENTS MICROCYTOSIS 1+ 
    
 RBC COMMENTS MACROCYTOSIS 1+ 
    
 RBC COMMENTS OVALOCYTES PRESENT 
    
 RBC COMMENTS POLYCHROMASIA 1+ METABOLIC PANEL, COMPREHENSIVE Collection Time: 01/07/21  2:47 PM  
Result Value Ref Range Sodium 138 136 - 145 mmol/L Potassium 4.4 3.5 - 5.1 mmol/L Chloride 108 97 - 108 mmol/L  
 CO2 21 21 - 32 mmol/L Anion gap 9 5 - 15 mmol/L Glucose 82 65 - 100 mg/dL BUN 34 (H) 6 - 20 MG/DL Creatinine 0.83 0.70 - 1.30 MG/DL  
 BUN/Creatinine ratio 41 (H) 12 - 20 GFR est AA >60 >60 ml/min/1.73m2 GFR est non-AA >60 >60 ml/min/1.73m2 Calcium 7.8 (L) 8.5 - 10.1 MG/DL Bilirubin, total 0.3 0.2 - 1.0 MG/DL  
 ALT (SGPT) 46 12 - 78 U/L  
 AST (SGOT) 101 (H) 15 - 37 U/L Alk. phosphatase 558 (H) 45 - 117 U/L Protein, total 5.6 (L) 6.4 - 8.2 g/dL Albumin 2.0 (L) 3.5 - 5.0 g/dL Globulin 3.6 2.0 - 4.0 g/dL A-G Ratio 0.6 (L) 1.1 - 2.2    
TROPONIN I Collection Time: 01/07/21  2:47 PM  
Result Value Ref Range Troponin-I, Qt. <0.05 <0.05 ng/mL PROTHROMBIN TIME + INR Collection Time: 01/07/21  2:47 PM  
Result Value Ref Range INR 1.6 (H) 0.9 - 1.1 Prothrombin time 16.0 (H) 9.0 - 11.1 sec EKG, 12 LEAD, INITIAL Collection Time: 01/07/21  2:50 PM  
Result Value Ref Range Ventricular Rate 133 BPM  
 Atrial Rate 133 BPM  
 QRS Duration 64 ms Q-T Interval 286 ms QTC Calculation (Bezet) 425 ms Calculated R Axis 29 degrees Calculated T Axis 52 degrees Diagnosis Probably sinus tachycardia Low voltage QRS Confirmed by Any Diana M.D., Zandra Carpenter ([id number]) on 1/7/2021 4:34:52 PM 
  
POC EG7 Collection Time: 01/07/21  2:54 PM  
Result Value Ref Range Calcium, ionized (POC) 1.17 1.12 - 1.32 mmol/L  
 pH (POC) 7.38 7.35 - 7.45    
 pCO2 (POC) 37.8 35.0 - 45.0 MMHG  
 pO2 (POC) 27 (LL) 80 - 100 MMHG  
 HCO3 (POC) 22.6 22 - 26 MMOL/L Base deficit (POC) 2 mmol/L  
 sO2 (POC) 51 (L) 92 - 97 % Site OTHER Device: ROOM AIR Allens test (POC) N/A Specimen type (POC) VENOUS BLOOD Patient temp. 98.6 Total resp. rate 25 TROPONIN I Collection Time: 01/07/21 11:28 PM  
Result Value Ref Range Troponin-I, Qt. <0.05 <0.05 ng/mL CULTURE, BLOOD Collection Time: 01/07/21 11:28 PM  
 Specimen: Blood Result Value Ref Range Special Requests: NO SPECIAL REQUESTS Culture result: NO GROWTH AFTER 6 HOURS    
LACTIC ACID Collection Time: 01/07/21 11:28 PM  
Result Value Ref Range Lactic acid 3.9 (HH) 0.4 - 2.0 MMOL/L  
CULTURE, BLOOD Collection Time: 01/07/21 11:28 PM  
 Specimen: Blood Result Value Ref Range Special Requests: NO SPECIAL REQUESTS Culture result: NO GROWTH AFTER 6 HOURS    
URINALYSIS W/ RFLX MICROSCOPIC Collection Time: 01/08/21 12:02 AM  
Result Value Ref Range Color PINK Appearance TURBID (A) CLEAR Specific gravity 1.026 1.003 - 1.030    
 pH (UA) 5.5 5.0 - 8.0 Protein 100 (A) NEG mg/dL Glucose Negative NEG mg/dL Ketone Negative NEG mg/dL Bilirubin Negative NEG Blood LARGE (A) NEG Urobilinogen 0.2 0.2 - 1.0 EU/dL Nitrites Negative NEG Leukocyte Esterase MODERATE (A) NEG    
CBC WITH AUTOMATED DIFF Collection Time: 01/08/21  3:14 AM  
Result Value Ref Range WBC 38.1 (H) 4.1 - 11.1 K/uL  
 RBC 2.28 (L) 4.10 - 5.70 M/uL HGB 6.6 (L) 12.1 - 17.0 g/dL HCT 20.8 (L) 36.6 - 50.3 % MCV 91.2 80.0 - 99.0 FL  
 MCH 28.9 26.0 - 34.0 PG  
 MCHC 31.7 30.0 - 36.5 g/dL RDW 20.7 (H) 11.5 - 14.5 % PLATELET 38 (LL) 348 - 400 K/uL MPV 10.9 8.9 - 12.9 FL  
 NRBC 0.6 (H) 0  WBC ABSOLUTE NRBC 0.24 (H) 0.00 - 0.01 K/uL NEUTROPHILS 94 (H) 32 - 75 % BAND NEUTROPHILS 4 0 - 6 % LYMPHOCYTES 1 (L) 12 - 49 % MONOCYTES 1 (L) 5 - 13 % EOSINOPHILS 0 0 - 7 % BASOPHILS 0 0 - 1 % IMMATURE GRANULOCYTES 0 %  
 ABS. NEUTROPHILS 37.3 (H) 1.8 - 8.0 K/UL  
 ABS. LYMPHOCYTES 0.4 (L) 0.8 - 3.5 K/UL  
 ABS. MONOCYTES 0.4 0.0 - 1.0 K/UL  
 ABS. EOSINOPHILS 0.0 0.0 - 0.4 K/UL  
 ABS. BASOPHILS 0.0 0.0 - 0.1 K/UL  
 ABS. IMM. GRANS. 0.0 K/UL  
 DF MANUAL    
 RBC COMMENTS MACROCYTOSIS 1+ 
    
 RBC COMMENTS POLYCHROMASIA 1+ 
    
 RBC COMMENTS MICROCYTOSIS 1+ 
    
 RBC COMMENTS ANISOCYTOSIS 
2+ WBC COMMENTS TOXIC GRANULATION    
TSH 3RD GENERATION Collection Time: 01/08/21  3:14 AM  
Result Value Ref Range TSH 2.56 0.36 - 3.74 uIU/mL FOLATE Collection Time: 01/08/21  3:14 AM  
Result Value Ref Range Folate 6.2 5.0 - 21.0 ng/mL VITAMIN B12 Collection Time: 01/08/21  3:14 AM  
Result Value Ref Range Vitamin B12 >2,000 (H) 193 - 986 pg/mL LACTIC ACID Collection Time: 01/08/21  3:14 AM  
Result Value Ref Range Lactic acid 3.4 (HH) 0.4 - 2.0 MMOL/L  
SAMPLES BEING HELD Collection Time: 01/08/21  3:14 AM  
Result Value Ref Range SAMPLES BEING HELD 1BLU   
 COMMENT Add-on orders for these samples will be processed based on acceptable specimen integrity and analyte stability, which may vary by analyte. METABOLIC PANEL, COMPREHENSIVE Collection Time: 01/08/21  3:16 AM  
Result Value Ref Range Sodium 143 136 - 145 mmol/L Potassium 4.2 3.5 - 5.1 mmol/L Chloride 113 (H) 97 - 108 mmol/L  
 CO2 21 21 - 32 mmol/L Anion gap 9 5 - 15 mmol/L Glucose 74 65 - 100 mg/dL BUN 32 (H) 6 - 20 MG/DL Creatinine 0.79 0.70 - 1.30 MG/DL  
 BUN/Creatinine ratio 41 (H) 12 - 20 GFR est AA >60 >60 ml/min/1.73m2 GFR est non-AA >60 >60 ml/min/1.73m2 Calcium 7.1 (L) 8.5 - 10.1 MG/DL Bilirubin, total 0.4 0.2 - 1.0 MG/DL  
 ALT (SGPT) 41 12 - 78 U/L  
 AST (SGOT) 90 (H) 15 - 37 U/L Alk. phosphatase 461 (H) 45 - 117 U/L Protein, total 5.0 (L) 6.4 - 8.2 g/dL Albumin 1.9 (L) 3.5 - 5.0 g/dL Globulin 3.1 2.0 - 4.0 g/dL A-G Ratio 0.6 (L) 1.1 - 2.2 MAGNESIUM Collection Time: 01/08/21  3:16 AM  
Result Value Ref Range Magnesium 1.6 1.6 - 2.4 mg/dL PHOSPHORUS Collection Time: 01/08/21  3:16 AM  
Result Value Ref Range Phosphorus 4.5 2.6 - 4.7 MG/DL  
LIPASE Collection Time: 01/08/21  3:16 AM  
Result Value Ref Range Lipase 334 73 - 393 U/L  
IRON Collection Time: 01/08/21  3:16 AM  
Result Value Ref Range Iron 130 35 - 150 ug/dL TROPONIN I Collection Time: 01/08/21  3:16 AM  
Result Value Ref Range Troponin-I, Qt. 0.07 (H) <0.05 ng/mL NT-PRO BNP Collection Time: 01/08/21  3:16 AM  
Result Value Ref Range NT pro- (H) <125 PG/ML  
SARS-COV-2 Collection Time: 01/08/21  7:05 AM  
Result Value Ref Range Specimen source Nasopharyngeal    
 Specimen source Nasopharyngeal    
 COVID-19 rapid test Not detected NOTD Specimen type NP Swab FERRITIN Collection Time: 01/08/21  7:12 AM  
Result Value Ref Range Ferritin 7,178 (H) 26 - 388 NG/ML  
 
_____________________ Physical Exam: General:  Sleepy, cooperative, no distress, appears stated age. Eyes:   Sclera closed Throat: Lips, mucosa, and tongue normal.  
Neck: Supple, symmetrical, trachea midline. Lungs:   Clear to auscultation bilaterally. Heart:  Regular rate and rhythm. Abdomen:   Soft,flat, non-tender. +G tube Extremities: Extremities thin w muscle wasting Skin: Skin w/d/i. Assessment:  
Principal Problem: 
  Bilateral pleural effusion (1/7/2021) Plan:  
 
Place bilateral chest tubes to drain pleural effusions CT to -20 suction Pleural fluid labs CXR 
O2 Thank you for including us in the care of your patient Signed By: MARTI Zamarripa   
 January 8, 2021

## 2021-01-08 NOTE — PROGRESS NOTES
CODE BLUE note: 
Maximino Prasad was called  at 1126. Patient is in ER room 25 Is less than 3-minute I was at bedside but the ER physician has been already into the room, room air is droplet isolation room, ER physician leading the code preparing for intubation while the rest of the team performing chest compression per ACLS protocol. I was able to gather basic information, he was a 61-year-old male who was admitted to the hospital January 7 with progressive shortness of breath. He has recurrence of metastatic squamous cell head and neck cancer to the bone and liver who failed initial line of treatment. He also had bilateral nephrostomy tube. He was found to have bilateral pleural effusion during this hospitalization and bilateral chest tube were inserted. Apparently patient condition deteriorated became more hypoxic and sustained asystole cardiac arrest. 
Patient was intubated by the ER staff, chest compressions were ongoing, I took leadership of the resuscitation effort at that point, so I can believe the ER physician obtained his other critical patient in the ER. We continued resuscitation effort, overall he received 6 doses of epinephrine, bicarb, D50 and calcium chloride. He was on maximum dose of norepinephrine. He had short episodes between epinephrine doses where he return to spontaneous circulation but these were short-lived. It was easy to ventilate him and we initiated mechanical ventilation with volume control rate of 16 PEEP of 8 and tidal volume of 450 with FiO2 100%. With that his lung complaints was acceptable with peak pressure not exceeding 30. He had very rare occasional spontaneous breathing effort. We obtained a chest x-ray that showed that bilateral chest tube remain in place, there is no tension pneumothorax, I could not appreciate large pneumothorax on the x-ray machine screen. –Unfortunately despite maximum effort and being on maximum dose of pressors and ventilatory support patient blood pressure continued to trend down and his heart rate started to drop down, giving his advanced metastatic cancer status with multiorgan involvement and the fact that we already did prolonged resuscitation without meaningful improvement I decided not to perform chest compression again.  I kept the maximum dose of pressors as well as mechanical ventilation but unfortunately patient already had fixed dilated pupil with no corneal reflex no spontaneous breathing effort.  He had no pulse and  no heart sounds at 1235. 
–I asked the nurses to notify his attending physician. 
Discharge summary and death note and certification [facility] attending physician 
 
I spent 60 minutes of critical care time providing direct care to Mr. Bello this afternoon during the CODE BLUE as detailed above.

## 2021-01-08 NOTE — PROGRESS NOTES
responded to pt death in ER. Pt's wife Tim Jane and her father where in the consult room. Pt's mother was on her way. Family just went in room to be at pt's bedside.  provided pastoral listening, support and assurance of prayer. Please contact [phone] [facility] for further support. [phone] Diana Wadsworth, MACE 
 [phone]

## 2021-01-08 NOTE — DISCHARGE SUMMARY
Discharge Summary  
 
 
PATIENT ID: Naresh Bello Jr. 
MRN: [id number]  
YOB: 1975   
DATE OF ADMISSION: 1/7/2021  2:31 PM   
DATE OF DISCHARGE: 1/8/2021   
PRIMARY CARE PROVIDER: Flavia Mustafa MD  
 
ATTENDING PHYSICIAN: Raleigh Peterson MD  
DISCHARGING PROVIDER: Raleigh Peterson MD   
To contact this individual call [phone] and ask the  to page.  If unavailable ask to be transferred the Adult Hospitalist Department. 
 
CONSULTATIONS: IP CONSULT TO THORACIC SURGERY 
IP CONSULT TO PALLIATIVE CARE - PROVIDER 
IP CONSULT TO ONCOLOGY 
 
PROCEDURES/SURGERIES: * No surgery found * 
 
ADMITTING DIAGNOSES & HOSPITAL COURSE:  
Per h and p:  
 
CHIEF COMPLAINT:  Shortness of breath. 
  
 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 The shortness of breath is worse with mild exertion such as walking. The patient has also noticed generalized weakness, which is also progressive and getting worse. EMS was called for him at home. When the EMS arrived at the scene, the patient's oxygen saturation was reported at 84% on room air. He was brought to the emergency room for further evaluation. When the patient arrived at the emergency room, CTA of the chest was negative for pulmonary embolism, but shows large pleural effusion with interval increase as well as bone and hepatic metastasis. The patient also has significant leukocytosis. He was referred to the hospitalist service for evaluation for admission. He was last admitted to this hospital from 12/22/2020 to 12/28/2020. The patient was admitted and treated for sepsis and replacement of bilateral nephrostomy tubes during that hospitalization. He was seen by the oncologist during that hospitalization. 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 The patient denies sick contact or contact with any person with COVID-19 virus infection. Issues on admission and subsequent:  
  
1.  Bilateral pleural effusion. ; - justen chest tubes place- see chest surg notes; 2. Metastatic squamous cell carcinoma of the head and neck assoc with clinical evident malnourishment, has peg tube; 3. Anemia. transfusion requested 4. Thrombocytopenia. 46 - 38  
5. Status post bilateral nephrostomy tube placement, reomote functioning 6. Sepsis. Iv resuscitation, iv abx, cult 7.  Acute cystitis with hematuria.; as above 8. ACUTE CODE BLUE 2021  Pt . ; see code note DISCHARGE DIAGNOSES / PLAN:   
 
1.  as above DISCHARGE MEDICATIONS: 
Current Discharge Medication List  
  
 
 
 
NOTIFY YOUR PHYSICIAN FOR ANY OF THE FOLLOWING:  
Fever over 101 degrees for 24 hours. Chest pain, shortness of breath, fever, chills, nausea, vomiting, diarrhea, change in mentation, falling, weakness, bleeding. Severe pain or pain not relieved by medications. Or, any other signs or symptoms that you may have questions about. DISPOSITION: 
  Home With: 
 OT  PT  HH  RN  
  
 Long term SNF/Inpatient Rehab Independent/assisted living Hospice  
x Other:  CHRONIC MEDICAL DIAGNOSES: 
Problem List as of 2021 Date Reviewed: 2021 Codes Class Noted - Resolved * (Principal) Bilateral pleural effusion ICD-10-CM: J90 ICD-9-CM: 511.9  2021 - Present Sepsis ([facility] [address] ICD-10-CM: A41.9 ICD-9-CM: 038.9, 995.91  2020 - Present Urinary retention ICD-10-CM: R33.9 ICD-9-CM: 788.20  2017 - Present Greater than 40  minutes were spent with the patient on counseling and coordination of care Signed:  
Jaime Denson MD 
2021 
2:26 PM

## 2021-01-08 NOTE — PROCEDURES
[address] [facility] [address],  [address] [place], [address] 
[phone] 
____________________________________________________________ Operative Report Procedure: Chest Tube Thoracostomy (CPT Code: 54715) Provider: MARTI Oshea Preoperative Diagnosis / Indication: Right pleural effusion Postoperative Diagnosis: same Medications: 18 cc's of 1% lidocaine EBL: minimal 
 
Technique: After Informed consent was obtained the patients right chest was prepped and draped in a sterile fashion. Time out was performed and all radiographs reviewed. 50 ml fentanyl IV administered. 18 ml 1% lidocaine infiltrated as local.  Needle advanced into right chest at 6th interspace. Yellow fluid drawn back into syringe. Guide wire advanced, then a small incision made in skin. Obturator passed over guide wire. An 8.5Fr chest tube was placed successfully. Sutured in place, covered with gauze and bandaged. Chest tube attached to wall suction and large amount of cloudy, yellow removed from right chest. Pleural fluid labs ordered. Patient tolerated the procedure well. No immediate complications. Post-placement CXR pending. Maximino Oshea 
1/8/2021

## 2021-01-08 NOTE — PROGRESS NOTES
Have contacted wife this am around 11:20 am  to inform of changed status as pt goes into full code blue from rapid response. Wife confirmed to me, Luli Lilly MD, that she desires full code. Wife is advised, given clinical presentations, ( justen chest tubes, low hgb  blood count - blood on order, poor nutritional status, cancer status , sepsis status , other) that likelihood of survival is near zero, but that if improves he will tansfer and  status will be =  on ventilator and to  ICU. Luli Lilly MD 1/8/2021

## 2021-01-08 NOTE — ED NOTES
11:48 AM 
Called to bedside of cardiac arrest to assist with intubation. Orotracheal Intubation: 
 
Indication for procedure: cardiac arrest 
RSI performed 11:38 AM 
Time 16-30 minutes, Performed by: Jalaine Schaumann, MD 
 
Psychiatric used. The patient was sedated with  etomidate and orotracheally intubated with a 7.5 cuffed Kosovan endotracheal tube using a Owens blade with direct visualization. Tube was advanced to 23 cm at the teeth/lips. ETT location confirmed by by auscultation, ETCO2 monitor and CXR ordered. good end-tidal CO2 detector color change, good b breath sounds, mist in tube. No bs over epigastrium. Complications: pt experienced cardiac arrest following intubation.  
 
Additional notes: 
Care assumed by hospitalist and intensivist.

## 2021-01-08 NOTE — ED NOTES
Unable to palpate pulses, Pt in PEA on the monitor. Decision made to stop attempts at life saving measures.   
 
Time of death @ [phone] by Dr. Kami Guevara

## 2021-01-08 NOTE — PROGRESS NOTES
Pharmacist Note - Vancomycin Dosing 
 
Consult provided for this 45 y.o. male for indication of sepsis of unknown origin/ pleural effusion. 
Antibiotic regimen(s): vanc + zosyn 
Patient on vancomycin PTA? NO  
 
Recent Labs  
  21 
1447  
WBC 43.1*  
CREA 0.83  
BUN 34*  
 
Frequency of BMP: daily 
Height: 182.9 cm 
Weight: 63.5 kg 
Est CrCl: >100 ml/min; UO: - ml/kg/hr 
Temp (24hrs), Av.7 °F (36.5 °C), Min:97.1 °F (36.2 °C), Max:98.5 °F (36.9 °C) 
 
Cultures: 
No micro pending 
 
Goal trough = 15 - 20 mcg/mL 
 
Therapy will be initiated with a loading dose of 1500 mg IV x 1 to be followed by a maintenance dose of 1000 mg IV every 8 hours.  Pharmacy to follow patient daily and order levels / make dose adjustments as appropriate.

## 2021-01-09 NOTE — PROGRESS NOTES
1020: Patient experiencing soft BP. Repositioned patient and changed chest tube drainage chamber with a new one since it became full. 2000 ml's worth. Also dpoke with Thoracic Surgeon PA who gave entered in orders for albumin and restarted patient's LR fluids. 1030: BP improved with MAP of 72. 
1035: Paged hospitalist regarding patient's soft BP and need for blood infusion for HGB 6.6 Orders received for NS Bolus and 1 unit PRBCs. 1039: Patient began to desaturate to 89%. Increased his O2 via nasal canula to 6%. No improvement. 1045:Placed non-rebreather on patient. No improvement. 1047:Called a rapid response. 1055: Provided ventilation via Ambu-bag.  
1056:Rapid response team arrive 1126: Code Blue

## 2021-01-12 LAB
BACTERIA SPEC CULT: NORMAL
GRAM STN SPEC: NORMAL
GRAM STN SPEC: NORMAL
SERVICE CMNT-IMP: NORMAL

## 2021-01-13 LAB
BACTERIA SPEC CULT: NORMAL
BACTERIA SPEC CULT: NORMAL
SERVICE CMNT-IMP: NORMAL
SERVICE CMNT-IMP: NORMAL

## 2021-02-20 LAB
ACID FAST STN SPEC: NEGATIVE
MYCOBACTERIUM SPEC QL CULT: NEGATIVE
SPECIMEN PREPARATION: NORMAL
SPECIMEN SOURCE: NORMAL

## 2021-02-22 NOTE — PROGRESS NOTES
Physician Progress Note Ally SAUCEDO #:                  [id number] :                       1975 ADMIT DATE:       2021 2:31 PM 
[address] [place] DATE:        2021 3:20 PM 
RESPONDING 
PROVIDER #:        Kurt Villanueva MD 
 
 
 
 
QUERY TEXT: 
 
Pt admitted with Sepsis and Cystitis. Pt noted to have Bilateral Nephrostomy Tubes which were replaced in Dec 2020. If possible, please document in the progress notes and discharge summary if you are evaluating and / or treating any of the following: The medical record reflects the following: 
Risk Factors: pt with replacement of nephrostomy tubes in Dec 2020. Clinical Indicators: admitted with tachycardia and increased HR, WBC 43.1/ 92 neutrophils, Lactic Acid 3.9. Pt diagnosed with Sepsis and also Cystitis, with hx of having nephrostomy tubes replaced in December. Documentation in the H&P that the Sepsis may be form the Acute Cystitis, especially since has indwelling cath. Treatment: Multiple NS IV Bolus' resuscitation, Zosyn/ Vanc IV. Thank you, if you have any questions please e-mail me at [email or web address] Options provided: 
-- Sepsis and Cystitis related to Nephrostomy tubes -- Sepsis and Cystitis unrelated to Nephrostomy Tubes -- Other - I will add my own diagnosis -- Disagree - Not applicable / Not valid -- Disagree - Clinically unable to determine / Unknown 
-- Refer to Clinical Documentation Reviewer PROVIDER RESPONSE TEXT: 
 
This patient has sepsis and cystitis related to Nephrostomy tubes.  
 
Query created by: Santana Houston on 2021 3:22 PM 
 
 
Electronically signed by:  Kurt Villanueva MD 2021 5:59 PM

## 2022-12-14 NOTE — PROGRESS NOTES
[facility] Adult  Hospitalist Group Hospitalist Progress Note Claudeen Jack, MD 
Answering service: [phone] OR 1968 from in house phone Date of Service:  2020 NAME:  Ashley Meneses. 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 Patient has bilateral percutaneous nephrostomy tubes. In the ER he was found to be septic due to UTI. Interval history / Subjective: Follow-up for sepsis. Leaking bilateral percutaneous nephrostomy's. Pt seen and examined. Feels lots of pain in L nephrostomy tube side, also b/l nephrostomy tubes draining very bloody urine, and left side leaking++ per RN. Will call IR to evaluate. No signs of infection, HB stable. Will aim to dc him for Ca treatment next week Assessment & Plan:  
#. B/l Nephrostomy tubes in situ: leaking on admit. IR replaced. #. UTI: UA suggestive, Urine culture pending. Empiric iv abx. #. Severe Sepsis: 2nd to above. Elevated wcc/HR. BCs NGTD #. Lactic acidosis: not resolving, hemodynamically stable, likely D- lactic. Stop checking - Zosyn. 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  
- CT cap: new hepatic metastases, new bone metastasis, new b/l pleural effusion.  
- Oncology following. Plans for op chemo. Blurred vision worse on the left. CT head: NAD. Per wife recent brain MRI: No mets Hypomagnesemia: Replace. Normocytic anemia and thrombocytopenia: likely cancer related. Tx Per Oncology 
- HB dropped to 6.3, s/p 1unit RBC. Monitor HB, transfuse if <7. Code status: Full code. Wife Fadia Adame [phone] DVT prophylaxis: Heparin Care Plan discussed with: Patient/Family and Nurse Anticipated Disposition: Home w/Family Greater than 48 hours Hospital Problems  Date Reviewed: 12/23/2020 Codes Class Noted POA * (Principal) Sepsis ([address] ICD-10-CM: A41.9 ICD-9-CM: 038.9, 995.91  12/22/2020 Yes Review of Systems: A comprehensive review of systems was negative except for that written in the HPI. Vital Signs:  
 Last 24hrs VS reviewed since prior progress note. Most recent are: 
Visit Vitals /86 (BP 1 Location: Right arm, BP Patient Position: At rest) Pulse 98 Temp 98.8 °F (37.1 °C) Resp 18 Ht 6' 0.01\" (1.829 m) Wt 64.4 kg (141 lb 15.6 oz) SpO2 95% BMI 19.25 kg/m² Intake/Output Summary (Last 24 hours) at 12/26/2020 5254 Last data filed at 12/26/2020 1150 Gross per 24 hour Intake  Output 1250 ml Net -1250 ml Physical Examination:  
 
I had a face to face encounter with this patient and independently examined them on12/26/2020 as outlined below: 
     
Constitutional:  Weak and chronically sick looking. Not in distress. Resp:  No accessory muscle use Chest Wall: No deformity CV:  Regular rhythm, normal rate, tachycardic GI:  Soft, non distended, non tender. :  Bilateral nephrostomy tubes in place. Bloody urine in bag Musculoskeletal:  No edema, warm Neurologic:  Mental status:AAOx3, Motor exam:Moves all extremities symmetrically Data Review:  
 Review and/or order of clinical lab test 
Review and/or order of tests in the radiology section of CPT Review and/or order of tests in the medicine section of CPT Labs:  
 
Recent Labs  
  12/26/20 0318 12/25/20 
0403 WBC 28.1* 25.5* HGB 7.7* 6.5* HCT 24.0* 20.2* PLT 77* 83* Recent Labs  
  12/26/20 0318 12/25/20 
0403 12/24/20 
0305  142 142  
K 3.3* 3.2* 3.8 * 111* 110* CO2 23 22 21 BUN 23* 25* 33* CREA 1.03 0.89 1.20 GLU 67 72 85  
CA 6.8* 6.5* 7.1*  
 MG 1.3*  --   --   
 
Recent Labs  
  12/26/20 
0318 12/25/20 
0403 12/24/20 
0305 ALT 30 34 37 * 321* 316* TBILI 0.9 0.4 0.4 TP 5.3* 5.2* 5.5* ALB 1.8* 1.7* 1.9*  
GLOB 3.5 3.5 3.6 Recent Labs  
  12/26/20 
5002 INR 1.4* PTP 14.9* APTT 37.6* No results for input(s): FE, TIBC, PSAT, FERR in the last 72 hours. Lab Results Component Value Date/Time Folate 14.1 12/23/2020 03:57 AM  
  
No results for input(s): PH, PCO2, PO2 in the last 72 hours. No results for input(s): CPK, CKNDX, TROIQ in the last 72 hours. No lab exists for component: CPKMB Lab Results Component Value Date/Time Cholesterol, total 220 (H) 09/26/2017 04:03 PM  
 HDL Cholesterol 37 (L) 09/26/2017 04:03 PM  
 LDL, calculated 118 (H) 09/26/2017 04:03 PM  
 Triglyceride 325 (H) 09/26/2017 04:03 PM  
 
Lab Results Component Value Date/Time Glucose (POC) 81 12/23/2020 06:16 AM  
 
Lab Results Component Value Date/Time Color RED 12/22/2020 03:23 PM  
 Appearance TURBID (A) 12/22/2020 03:23 PM  
 Specific gravity 1.015 12/22/2020 03:23 PM  
 pH (UA) 6.5 12/22/2020 03:23 PM  
 Protein 100 (A) 12/22/2020 03:23 PM  
 Glucose Negative 12/22/2020 03:23 PM  
 Ketone TRACE (A) 12/22/2020 03:23 PM  
 Urobilinogen 0.2 12/22/2020 03:23 PM  
 Nitrites Positive (A) 12/22/2020 03:23 PM  
 Leukocyte Esterase MODERATE (A) 12/22/2020 03:23 PM  
 Epithelial cells FEW 12/22/2020 03:23 PM  
 Bacteria 1+ (A) 12/22/2020 03:23 PM  
 WBC 20-50 12/22/2020 03:23 PM  
 RBC >100 (H) 12/22/2020 03:23 PM  
 
 
 
Medications Reviewed:  
 
Current Facility-Administered Medications Medication Dose Route Frequency  0.9% sodium chloride infusion 250 mL  250 mL IntraVENous PRN  
 oxyCODONE IR (ROXICODONE) tablet 5 mg  5 mg Oral Q6H PRN  
 melatonin tablet 3 mg  3 mg Oral QHS PRN  
 tamsulosin (FLOMAX) capsule 0.4 mg  0.4 mg Oral DAILY  sodium chloride (NS) flush 5-40 mL  5-40 mL IntraVENous Q8H  
  sodium chloride (NS) flush 5-40 mL  5-40 mL IntraVENous PRN  
 acetaminophen (TYLENOL) tablet 650 mg  650 mg Oral Q6H PRN Or  
 acetaminophen (TYLENOL) suppository 650 mg  650 mg Rectal Q6H PRN  polyethylene glycol (MIRALAX) packet 17 g  17 g Oral DAILY PRN  promethazine (PHENERGAN) tablet 12.5 mg  12.5 mg Oral Q6H PRN Or  
 ondansetron (ZOFRAN) injection 4 mg  4 mg IntraVENous Q6H PRN  piperacillin-tazobactam (ZOSYN) 3.375 g in 0.9% sodium chloride (MBP/ADV) 100 mL MBP  3.375 g IntraVENous Q8H  
 lactated Ringers infusion  150 mL/hr IntraVENous CONTINUOUS  
 sodium chloride (NS) flush 5-10 mL  5-10 mL IntraVENous PRN  
 
______________________________________________________________________ EXPECTED LENGTH OF STAY: 4d 19h ACTUAL LENGTH OF STAY:          4 Efrain Pearson MD  
 Wartpeel Counseling:  I discussed with the patient the risks of Wartpeel including but not limited to erythema, scaling, itching, weeping, crusting, and pain.

## 2025-07-05 NOTE — WOUND CARE
WOCN Note:  
 
New consult for sacrum Chart shows: 
Admitted for sepsis History of metastatic cancer Followed by urology for bilateral nephrostomy tubes Assessment:  
A&O x 4 and reports no pain. Able to turn independently left & right. Continent. Bed: versacare with foam mattress Bilateral heel and buttocks intact and without erythema. 1. POA, sacral unstageable pressure injury = 1.3 x 0.5 x 0.2 cm  Base is 75% soft yellow 25% pink. no exudate. Periwound intact with one satellite area of partial thickness = 0.3 x 0.3 x 0.1 cm Cleaned and hydrocolloid applied. Wound Recommendations:   
Sacrum: Please maintain hydrocolloid up to one week or change as needed with soiling or rolled edges. Please use adhesive remover wipes when changing. PI Prevention: 
Turn/reposition approximately every 2 hours Offload heels with pillows at all times in bed. Can use skin prep to skin on back for leaking nephrostomy tubes. Discussed with RN. Transition of Care: Plan to follow weekly and as needed while admitted to hospital.  
 
JOESPH Potts, RN, [facility] [place] Certified Wound, Ostomy, Continence Nurse 
office [phone] 
pager [phone] or call  to page Allergy; Fall Risk;